# Patient Record
Sex: MALE | Race: WHITE | NOT HISPANIC OR LATINO | Employment: OTHER | URBAN - METROPOLITAN AREA
[De-identification: names, ages, dates, MRNs, and addresses within clinical notes are randomized per-mention and may not be internally consistent; named-entity substitution may affect disease eponyms.]

---

## 2019-12-14 ENCOUNTER — TRANSCRIBE ORDERS (OUTPATIENT)
Dept: RADIOLOGY | Age: 69
End: 2019-12-14

## 2019-12-14 ENCOUNTER — HOSPITAL ENCOUNTER (OUTPATIENT)
Dept: RADIOLOGY | Age: 69
Discharge: HOME | End: 2019-12-14
Attending: INTERNAL MEDICINE
Payer: MEDICARE

## 2019-12-14 DIAGNOSIS — R94.39 ABNORMAL RESULT OF OTHER CARDIOVASCULAR FUNCTION STUDY: Primary | ICD-10-CM

## 2019-12-14 DIAGNOSIS — R94.39 ABNORMAL RESULT OF OTHER CARDIOVASCULAR FUNCTION STUDY: ICD-10-CM

## 2019-12-14 PROCEDURE — 71046 X-RAY EXAM CHEST 2 VIEWS: CPT

## 2019-12-17 ENCOUNTER — APPOINTMENT (OUTPATIENT)
Dept: LAB | Facility: HOSPITAL | Age: 69
End: 2019-12-17
Attending: INTERNAL MEDICINE
Payer: MEDICARE

## 2019-12-17 ENCOUNTER — TRANSCRIBE ORDERS (OUTPATIENT)
Dept: REGISTRATION | Facility: HOSPITAL | Age: 69
End: 2019-12-17

## 2019-12-17 DIAGNOSIS — R94.39 ABNORMAL RESULT OF OTHER CARDIOVASCULAR FUNCTION STUDY: ICD-10-CM

## 2019-12-17 DIAGNOSIS — R94.39 ABNORMAL RESULT OF OTHER CARDIOVASCULAR FUNCTION STUDY: Primary | ICD-10-CM

## 2019-12-17 LAB
APTT PPP: 27 SEC (ref 23–35)
BASOPHILS # BLD: 0.01 K/UL (ref 0.01–0.1)
BASOPHILS NFR BLD: 0.1 %
DIFFERENTIAL METHOD BLD: NORMAL
EOSINOPHIL # BLD: 0.15 K/UL (ref 0.04–0.54)
EOSINOPHIL NFR BLD: 1.8 %
ERYTHROCYTE [DISTWIDTH] IN BLOOD BY AUTOMATED COUNT: 12.9 % (ref 11.6–14.4)
HCT VFR BLDCO AUTO: 40.2 % (ref 40.1–51)
HGB BLD-MCNC: 13.1 G/DL
IMM GRANULOCYTES # BLD AUTO: 0.02 K/UL (ref 0–0.08)
IMM GRANULOCYTES NFR BLD AUTO: 0.2 %
INR PPP: 1 INR
LYMPHOCYTES # BLD: 1.94 K/UL (ref 1.2–3.5)
LYMPHOCYTES NFR BLD: 23.3 %
MCH RBC QN AUTO: 30.5 PG (ref 28–33.2)
MCHC RBC AUTO-ENTMCNC: 32.6 G/DL (ref 32.2–36.5)
MCV RBC AUTO: 93.7 FL (ref 83–98)
MONOCYTES # BLD: 0.83 K/UL (ref 0.3–1)
MONOCYTES NFR BLD: 10 %
NEUTROPHILS # BLD: 5.36 K/UL (ref 1.7–7)
NEUTS SEG NFR BLD: 64.6 %
NRBC BLD-RTO: 0 %
PDW BLD AUTO: 12.1 FL (ref 9.4–12.4)
PLATELET # BLD AUTO: 272 K/UL
PROTHROMBIN TIME: 13.3 SEC (ref 12.2–14.5)
RBC # BLD AUTO: 4.29 M/UL (ref 4.5–5.8)
WBC # BLD AUTO: 8.31 K/UL

## 2019-12-17 PROCEDURE — 85610 PROTHROMBIN TIME: CPT

## 2019-12-17 PROCEDURE — 85730 THROMBOPLASTIN TIME PARTIAL: CPT

## 2019-12-17 PROCEDURE — 36415 COLL VENOUS BLD VENIPUNCTURE: CPT | Mod: GA

## 2019-12-17 PROCEDURE — 85025 COMPLETE CBC W/AUTO DIFF WBC: CPT

## 2019-12-18 ENCOUNTER — HOSPITAL ENCOUNTER (OUTPATIENT)
Facility: HOSPITAL | Age: 69
Discharge: HOME | End: 2019-12-18
Attending: INTERNAL MEDICINE | Admitting: INTERNAL MEDICINE
Payer: MEDICARE

## 2019-12-18 VITALS
OXYGEN SATURATION: 96 % | HEART RATE: 62 BPM | WEIGHT: 210 LBS | TEMPERATURE: 97.9 F | RESPIRATION RATE: 16 BRPM | BODY MASS INDEX: 29.4 KG/M2 | DIASTOLIC BLOOD PRESSURE: 66 MMHG | HEIGHT: 71 IN | SYSTOLIC BLOOD PRESSURE: 118 MMHG

## 2019-12-18 DIAGNOSIS — R94.39 ABNORMAL STRESS TEST: ICD-10-CM

## 2019-12-18 DIAGNOSIS — I25.10 CAD (CORONARY ARTERY DISEASE): Primary | ICD-10-CM

## 2019-12-18 LAB
ATRIAL RATE: 58
CATH EF ESTIMATED: 60 %
P AXIS: 66
PR INTERVAL: 180
QRS DURATION: 108
QT INTERVAL: 414
QTC CALCULATION(BAZETT): 406
R AXIS: 44
T WAVE AXIS: 45
VENTRICULAR RATE: 58

## 2019-12-18 PROCEDURE — 71000001 HC PACU PHASE 1 INITIAL 30MIN: Performed by: INTERNAL MEDICINE

## 2019-12-18 PROCEDURE — C1887 CATHETER, GUIDING: HCPCS | Performed by: INTERNAL MEDICINE

## 2019-12-18 PROCEDURE — 71000011 HC PACU PHASE 1 EA ADDL MIN: Performed by: INTERNAL MEDICINE

## 2019-12-18 PROCEDURE — 25000000 HC PHARMACY GENERAL: Performed by: INTERNAL MEDICINE

## 2019-12-18 PROCEDURE — C9600 PERC DRUG-EL COR STENT SING: HCPCS | Performed by: INTERNAL MEDICINE

## 2019-12-18 PROCEDURE — C1725 CATH, TRANSLUMIN NON-LASER: HCPCS | Performed by: INTERNAL MEDICINE

## 2019-12-18 PROCEDURE — C1769 GUIDE WIRE: HCPCS | Performed by: INTERNAL MEDICINE

## 2019-12-18 PROCEDURE — 63700000 HC SELF-ADMINISTRABLE DRUG: Performed by: INTERNAL MEDICINE

## 2019-12-18 PROCEDURE — 93458 L HRT ARTERY/VENTRICLE ANGIO: CPT | Performed by: INTERNAL MEDICINE

## 2019-12-18 PROCEDURE — 85347 COAGULATION TIME ACTIVATED: CPT | Performed by: INTERNAL MEDICINE

## 2019-12-18 PROCEDURE — B2111ZZ FLUOROSCOPY OF MULTIPLE CORONARY ARTERIES USING LOW OSMOLAR CONTRAST: ICD-10-PCS | Performed by: INTERNAL MEDICINE

## 2019-12-18 PROCEDURE — C1874 STENT, COATED/COV W/DEL SYS: HCPCS | Performed by: INTERNAL MEDICINE

## 2019-12-18 PROCEDURE — 25800000 HC PHARMACY IV SOLUTIONS: Performed by: NURSE PRACTITIONER

## 2019-12-18 PROCEDURE — 25800000 HC PHARMACY IV SOLUTIONS: Performed by: INTERNAL MEDICINE

## 2019-12-18 PROCEDURE — 63600105 HC IODINE BASED CONTRAST: Performed by: INTERNAL MEDICINE

## 2019-12-18 PROCEDURE — 027035Z DILATION OF CORONARY ARTERY, ONE ARTERY WITH TWO DRUG-ELUTING INTRALUMINAL DEVICES, PERCUTANEOUS APPROACH: ICD-10-PCS | Performed by: INTERNAL MEDICINE

## 2019-12-18 PROCEDURE — 93005 ELECTROCARDIOGRAM TRACING: CPT | Performed by: NURSE PRACTITIONER

## 2019-12-18 PROCEDURE — 63700000 HC SELF-ADMINISTRABLE DRUG: Performed by: NURSE PRACTITIONER

## 2019-12-18 PROCEDURE — C1894 INTRO/SHEATH, NON-LASER: HCPCS | Performed by: INTERNAL MEDICINE

## 2019-12-18 PROCEDURE — 4A023N7 MEASUREMENT OF CARDIAC SAMPLING AND PRESSURE, LEFT HEART, PERCUTANEOUS APPROACH: ICD-10-PCS | Performed by: INTERNAL MEDICINE

## 2019-12-18 PROCEDURE — 27200000 HC STERILE SUPPLY: Performed by: INTERNAL MEDICINE

## 2019-12-18 PROCEDURE — 63600000 HC DRUGS/DETAIL CODE: Performed by: INTERNAL MEDICINE

## 2019-12-18 DEVICE — EVEROLIMUS-ELUTING PLATINUM CHROMIUM CORONARY STENT SYSTEM
Type: IMPLANTABLE DEVICE | Site: CORONARY | Status: FUNCTIONAL
Brand: SYNERGY™

## 2019-12-18 RX ORDER — SODIUM CHLORIDE 9 MG/ML
INJECTION, SOLUTION INTRAVENOUS CONTINUOUS
Status: DISCONTINUED | OUTPATIENT
Start: 2019-12-18 | End: 2019-12-18

## 2019-12-18 RX ORDER — SODIUM CHLORIDE 450 MG/100ML
INJECTION, SOLUTION INTRAVENOUS CONTINUOUS
Status: DISCONTINUED | OUTPATIENT
Start: 2019-12-18 | End: 2019-12-18 | Stop reason: HOSPADM

## 2019-12-18 RX ORDER — NAPROXEN SODIUM 220 MG/1
81 TABLET, FILM COATED ORAL ONCE
Status: COMPLETED | OUTPATIENT
Start: 2019-12-18 | End: 2019-12-18

## 2019-12-18 RX ORDER — CLOPIDOGREL BISULFATE 75 MG/1
TABLET ORAL AS NEEDED
Status: DISCONTINUED | OUTPATIENT
Start: 2019-12-18 | End: 2019-12-18 | Stop reason: HOSPADM

## 2019-12-18 RX ORDER — FERROUS SULFATE 325(65) MG
65 TABLET ORAL
COMMUNITY

## 2019-12-18 RX ORDER — ASPIRIN 81 MG/1
81 TABLET ORAL DAILY
COMMUNITY

## 2019-12-18 RX ORDER — MIDAZOLAM HYDROCHLORIDE 2 MG/2ML
INJECTION, SOLUTION INTRAMUSCULAR; INTRAVENOUS AS NEEDED
Status: DISCONTINUED | OUTPATIENT
Start: 2019-12-18 | End: 2019-12-18 | Stop reason: HOSPADM

## 2019-12-18 RX ORDER — LOSARTAN POTASSIUM AND HYDROCHLOROTHIAZIDE 25; 100 MG/1; MG/1
1 TABLET ORAL DAILY
COMMUNITY
End: 2024-10-28 | Stop reason: HOSPADM

## 2019-12-18 RX ORDER — ALFUZOSIN HYDROCHLORIDE 10 MG/1
10 TABLET, EXTENDED RELEASE ORAL DAILY
COMMUNITY

## 2019-12-18 RX ORDER — ROSUVASTATIN CALCIUM 20 MG/1
20 TABLET, COATED ORAL DAILY
Qty: 60 TABLET | Refills: 6 | Status: SHIPPED | OUTPATIENT
Start: 2019-12-18 | End: 2024-10-28

## 2019-12-18 RX ORDER — NITROGLYCERIN 0.4 MG/1
0.4 TABLET SUBLINGUAL EVERY 5 MIN PRN
Status: DISCONTINUED | OUTPATIENT
Start: 2019-12-18 | End: 2019-12-18 | Stop reason: HOSPADM

## 2019-12-18 RX ORDER — NICARDIPINE HCL-0.9% SOD CHLOR 1 MG/10 ML
SYRINGE (ML) INTRAVENOUS AS NEEDED
Status: DISCONTINUED | OUTPATIENT
Start: 2019-12-18 | End: 2019-12-18 | Stop reason: HOSPADM

## 2019-12-18 RX ORDER — HEPARIN SODIUM 1000 [USP'U]/ML
INJECTION, SOLUTION INTRAVENOUS; SUBCUTANEOUS AS NEEDED
Status: DISCONTINUED | OUTPATIENT
Start: 2019-12-18 | End: 2019-12-18 | Stop reason: HOSPADM

## 2019-12-18 RX ORDER — ASPIRIN 325 MG
325 TABLET ORAL ONCE
Status: DISCONTINUED | OUTPATIENT
Start: 2019-12-18 | End: 2019-12-18

## 2019-12-18 RX ORDER — CLOPIDOGREL BISULFATE 75 MG/1
75 TABLET ORAL DAILY
Qty: 30 TABLET | Refills: 6 | Status: SHIPPED | OUTPATIENT
Start: 2019-12-18 | End: 2024-10-28 | Stop reason: HOSPADM

## 2019-12-18 RX ORDER — ROSUVASTATIN CALCIUM 10 MG/1
10 TABLET, COATED ORAL DAILY
Status: ON HOLD | COMMUNITY
End: 2019-12-18 | Stop reason: SDUPTHER

## 2019-12-18 RX ORDER — OMEPRAZOLE 20 MG/1
40 CAPSULE, DELAYED RELEASE ORAL
COMMUNITY

## 2019-12-18 RX ORDER — UBIDECARENONE 100 MG
200 CAPSULE ORAL DAILY
COMMUNITY

## 2019-12-18 RX ORDER — ATROPINE SULFATE 0.1 MG/ML
0.5 INJECTION INTRAVENOUS EVERY 5 MIN PRN
Status: DISCONTINUED | OUTPATIENT
Start: 2019-12-18 | End: 2019-12-18 | Stop reason: HOSPADM

## 2019-12-18 RX ORDER — ACETAMINOPHEN 500 MG
5000 TABLET ORAL DAILY
COMMUNITY

## 2019-12-18 RX ORDER — METOPROLOL SUCCINATE 25 MG/1
25 TABLET, EXTENDED RELEASE ORAL DAILY
COMMUNITY
End: 2024-10-28 | Stop reason: HOSPADM

## 2019-12-18 RX ORDER — SODIUM CHLORIDE 450 MG/100ML
INJECTION, SOLUTION INTRAVENOUS CONTINUOUS PRN
Status: COMPLETED | OUTPATIENT
Start: 2019-12-18 | End: 2019-12-18

## 2019-12-18 RX ORDER — ROSUVASTATIN CALCIUM 10 MG/1
20 TABLET, COATED ORAL DAILY
Qty: 60 TABLET | Refills: 6 | Status: SHIPPED | OUTPATIENT
Start: 2019-12-18 | End: 2019-12-18 | Stop reason: SDUPTHER

## 2019-12-18 RX ORDER — LIDOCAINE HYDROCHLORIDE 10 MG/ML
INJECTION, SOLUTION INFILTRATION; PERINEURAL AS NEEDED
Status: DISCONTINUED | OUTPATIENT
Start: 2019-12-18 | End: 2019-12-18 | Stop reason: HOSPADM

## 2019-12-18 RX ORDER — FENTANYL CITRATE 50 UG/ML
INJECTION, SOLUTION INTRAMUSCULAR; INTRAVENOUS AS NEEDED
Status: DISCONTINUED | OUTPATIENT
Start: 2019-12-18 | End: 2019-12-18 | Stop reason: HOSPADM

## 2019-12-18 RX ADMIN — SODIUM CHLORIDE: 4.5 INJECTION, SOLUTION INTRAVENOUS at 07:15

## 2019-12-18 RX ADMIN — ASPIRIN 81 MG 81 MG: 81 TABLET ORAL at 07:15

## 2019-12-18 NOTE — Clinical Note
Stent deployed in the distal left anterior descending. Pressure = 11 liz. Inflation time =  10 seconds.

## 2019-12-18 NOTE — Clinical Note
The bilateral groins and left radial was clipped, marked  and prepped with ChloraPrep. The patient was draped in a sterile fashion after allowing for the recommended dry time.

## 2019-12-18 NOTE — Clinical Note
Stent deployed in the mid left anterior descending. Pressure = 11 liz. Inflation time =  10 seconds.

## 2019-12-18 NOTE — DISCHARGE SUMMARY
Same Day Discharge after PCI.   All pre and Intra procedural criteria met.  Radial artery stable.  Patient to be discharged home with wife.  Plavix prescription sent electronically and confirmed with pharmacy.   Patient agrees to pick-up prescription today. Reinforced importance of DAPT compliance.  Tolerated diet.   Voided without difficulty. Vital signs stable post PCI.   No neuro changes.   Patient ambulated without symptoms. EKG unchanged.   Okay for discharge home.

## 2019-12-18 NOTE — PRE-PROCEDURE NOTE
Cardiac Cath Lab Pre-procedure Note    - Patient was seen and examined at bedside.  - The patient's chart and all data was reviewed.  - The procedure, treatment alternatives, risks and benefits were explained with specific risks discussed.  - Patient was consented for cardiac cath procedure and possible PCI.  - Patient's case was found appropriate for dual antiplatelet therapy.    Patient's clinical presentation to the cardiac cath lab: stable ischemic symptoms.       Patient appears to be managing well.     Notable Non-Invasive Cardiac Testing  - Stress Test: positive nuclear stress test, risk for ischemia: intermediate, date: 12/9/2019.         Total anti-anginal medications: 1.         Pre-sedation assessment  ASA 2   Mallampati class: II - soft palate, uvula, fauces visible.

## 2019-12-18 NOTE — POST-PROCEDURE NOTE
Pt ambul in department per same day dc criteria, pt pain free Ekg done and reviewed by Gretchen LEONARDO cleared for dc at 1430

## 2019-12-18 NOTE — DISCHARGE INSTRUCTIONS
Follow-up with Dr. Boswell in one week after your cardiac catheterization.    Precautions/instructions following cardiac cath via radial artery/wrist:  -NO DRIVING FOR 24 HOURS  --Limit use of affected arm for 24 hrs  --No lifting anything greater than 5lbs for 3 days with affected wrist  --Remove dressing the following day.   - Keep site clean and dry  --Avoid submerging wrist in sitting water (tub, hot tub, pool, dish-washing, ocean) for 7 days.   - You may shower  --Avoid activities where the wrist may get heavily soiled (such as gardening, housecleaning etc) for 7 days.  --Check the site daily, call physician if you notice swelling, redness, drainage, increased discomfort, new numbness or fever greater than 101F  --IF BLEEDING OCCURS:  ---sit and apply firm pressure to site with your fingers for 10 minutes. If bleeding stops, continue to sit quietly keeping your wrist straight for 2 hrs and notify your physician as soon as possible.  ---If bleeding does not stop after 10 minutes or if there is a large amount of bleeding or spurting call 911 IMMEDIATELY, DO NOT DRIVE YOURSELF TO THE HOSPITAL

## 2019-12-19 LAB
ATRIAL RATE: 68
P AXIS: 65
POCT ACT-LR: 313 SEC (ref 113–149)
POCT ACT-LR: 361 SEC (ref 113–149)
POCT TEST: ABNORMAL
POCT TEST: ABNORMAL
PR INTERVAL: 164
QRS DURATION: 88
QT INTERVAL: 382
QTC CALCULATION(BAZETT): 406
R AXIS: 39
T WAVE AXIS: 43
VENTRICULAR RATE: 68

## 2020-01-21 ENCOUNTER — TREATMENT (OUTPATIENT)
Dept: CARDIAC REHAB | Facility: HOSPITAL | Age: 70
End: 2020-01-21
Attending: INTERNAL MEDICINE
Payer: MEDICARE

## 2020-01-21 VITALS — WEIGHT: 214 LBS | HEIGHT: 67 IN | BODY MASS INDEX: 33.59 KG/M2

## 2020-01-21 DIAGNOSIS — Z98.61 S/P PTCA (PERCUTANEOUS TRANSLUMINAL CORONARY ANGIOPLASTY): Primary | ICD-10-CM

## 2020-01-21 PROCEDURE — 93798 PHYS/QHP OP CAR RHAB W/ECG: CPT

## 2020-01-21 NOTE — LETTER
20    Re: Klaus Branch    : 1950    CARDIAC REHAB UNDERWAY!    Dear Dr. Boswell:    Thank you for referring your patient, Klaus Branch, to our cardiac rehabilitation program. The patient has completed the intake process and is about to begin cardiac rehab up to three times per week for both exercise and education. Rehab emphasis will be on risk factor reduction/secondary prevention. We will be working with your patient on:    Exercise safety, healthy eating, weight loss.    And, since medication compliance is an integral part of overall cardiac disease management, we are attaching a copy of the patient's current medication list as he reported it to us. Please note any discrepancies with what you have prescribed.       Current Outpatient Medications:   •  alfuzosin (UROXATRAL) 10 mg 24 hr tablet, Take 10 mg by mouth daily., Disp: , Rfl:   •  aspirin 81 mg enteric coated tablet, Take 81 mg by mouth daily., Disp: , Rfl:   •  cholecalciferol, vitamin D3, 5,000 unit (125 mcg) tablet, Take 5,000 Units by mouth daily., Disp: , Rfl:   •  clopidogrel (PLAVIX) 75 mg tablet, Take 1 tablet (75 mg total) by mouth daily., Disp: 30 tablet, Rfl: 6  •  coenzyme Q10 (COQ10) 100 mg capsule, Take 200 mg by mouth daily., Disp: , Rfl:   •  ferrous sulfate (IRON) 325 mg (65 mg iron) tablet, Take 65 mg by mouth daily with breakfast., Disp: , Rfl:   •  losartan-hydrochlorothiazide (HYZAAR) 100-25 mg per tablet, Take 1 tablet by mouth daily., Disp: , Rfl:   •  metoprolol succinate XL (TOPROL-XL) 25 mg 24 hr tablet, Take 25 mg by mouth daily., Disp: , Rfl:   •  omeprazole (PriLOSEC) 20 mg capsule, Take 40 mg by mouth daily before breakfast., Disp: , Rfl:   •  rosuvastatin (CRESTOR) 20 mg tablet, Take 1 tablet (20 mg total) by mouth daily., Disp: 60 tablet, Rfl: 6       Your patient has identified the following personal goals to be achieved as a result of rehab participation:    Return to prior activities, routine gym  schedule, increase water intake, decrease desserts.    We will be in touch with your office periodically to both request and provide information of mutual interest and, of course, we will notify you immediately of any major change in your patient's condition while in rehab.      Please feel free to call or stop by anytime to discuss your patient's progress and to see your patient in rehab action!    Sincerely,    Saadia Acevedo RN     [address] [place] [address]  Phone [phone]  Fax  [phone]      DIAGNOSIS: S/P PTCA (percutaneous transluminal coronary angioplasty)  (primary encounter diagnosis)

## 2020-01-22 ENCOUNTER — TREATMENT (OUTPATIENT)
Dept: CARDIAC REHAB | Facility: HOSPITAL | Age: 70
End: 2020-01-22
Attending: INTERNAL MEDICINE
Payer: MEDICARE

## 2020-01-22 DIAGNOSIS — Z98.61 S/P PTCA (PERCUTANEOUS TRANSLUMINAL CORONARY ANGIOPLASTY): Primary | ICD-10-CM

## 2020-01-22 PROCEDURE — 93798 PHYS/QHP OP CAR RHAB W/ECG: CPT

## 2020-01-24 ENCOUNTER — TREATMENT (OUTPATIENT)
Dept: CARDIAC REHAB | Facility: HOSPITAL | Age: 70
End: 2020-01-24
Attending: INTERNAL MEDICINE
Payer: MEDICARE

## 2020-01-24 DIAGNOSIS — Z98.61 S/P PTCA (PERCUTANEOUS TRANSLUMINAL CORONARY ANGIOPLASTY): Primary | ICD-10-CM

## 2020-01-24 PROCEDURE — 93798 PHYS/QHP OP CAR RHAB W/ECG: CPT

## 2020-01-24 NOTE — PROGRESS NOTES
Pt exercised at phase two cardiac rehab with no issues. Please refer to scanned report. Started free weights today, lion. Well.

## 2020-01-27 ENCOUNTER — TREATMENT (OUTPATIENT)
Dept: CARDIAC REHAB | Facility: HOSPITAL | Age: 70
End: 2020-01-27
Attending: INTERNAL MEDICINE
Payer: MEDICARE

## 2020-01-27 DIAGNOSIS — Z98.61 S/P PTCA (PERCUTANEOUS TRANSLUMINAL CORONARY ANGIOPLASTY): Primary | ICD-10-CM

## 2020-01-27 PROCEDURE — 93798 PHYS/QHP OP CAR RHAB W/ECG: CPT

## 2020-01-27 NOTE — PROGRESS NOTES
Pt exercised at phase two cardiac rehab without issues. May use RPE for THR, explained to patient.  Please refer to scanned report.

## 2020-01-29 ENCOUNTER — TREATMENT (OUTPATIENT)
Dept: CARDIAC REHAB | Facility: HOSPITAL | Age: 70
End: 2020-01-29
Attending: INTERNAL MEDICINE
Payer: MEDICARE

## 2020-01-29 DIAGNOSIS — Z98.61 S/P PTCA (PERCUTANEOUS TRANSLUMINAL CORONARY ANGIOPLASTY): Primary | ICD-10-CM

## 2020-01-29 PROCEDURE — 93798 PHYS/QHP OP CAR RHAB W/ECG: CPT

## 2020-01-31 ENCOUNTER — TREATMENT (OUTPATIENT)
Dept: CARDIAC REHAB | Facility: HOSPITAL | Age: 70
End: 2020-01-31
Attending: INTERNAL MEDICINE
Payer: MEDICARE

## 2020-01-31 DIAGNOSIS — Z98.61 S/P PTCA (PERCUTANEOUS TRANSLUMINAL CORONARY ANGIOPLASTY): Primary | ICD-10-CM

## 2020-01-31 PROCEDURE — 93798 PHYS/QHP OP CAR RHAB W/ECG: CPT

## 2020-02-03 ENCOUNTER — TREATMENT (OUTPATIENT)
Dept: CARDIAC REHAB | Facility: HOSPITAL | Age: 70
End: 2020-02-03
Attending: INTERNAL MEDICINE
Payer: MEDICARE

## 2020-02-03 DIAGNOSIS — Z98.61 S/P PTCA (PERCUTANEOUS TRANSLUMINAL CORONARY ANGIOPLASTY): Primary | ICD-10-CM

## 2020-02-03 PROCEDURE — 93798 PHYS/QHP OP CAR RHAB W/ECG: CPT

## 2020-02-05 ENCOUNTER — TREATMENT (OUTPATIENT)
Dept: CARDIAC REHAB | Facility: HOSPITAL | Age: 70
End: 2020-02-05
Attending: INTERNAL MEDICINE
Payer: MEDICARE

## 2020-02-05 DIAGNOSIS — Z98.61 S/P PTCA (PERCUTANEOUS TRANSLUMINAL CORONARY ANGIOPLASTY): Primary | ICD-10-CM

## 2020-02-05 PROCEDURE — 93798 PHYS/QHP OP CAR RHAB W/ECG: CPT

## 2020-02-07 ENCOUNTER — TREATMENT (OUTPATIENT)
Dept: CARDIAC REHAB | Facility: HOSPITAL | Age: 70
End: 2020-02-07
Attending: INTERNAL MEDICINE
Payer: MEDICARE

## 2020-02-07 DIAGNOSIS — Z98.61 S/P PTCA (PERCUTANEOUS TRANSLUMINAL CORONARY ANGIOPLASTY): Primary | ICD-10-CM

## 2020-02-07 PROCEDURE — 93798 PHYS/QHP OP CAR RHAB W/ECG: CPT

## 2020-02-10 ENCOUNTER — TREATMENT (OUTPATIENT)
Dept: CARDIAC REHAB | Facility: HOSPITAL | Age: 70
End: 2020-02-10
Attending: INTERNAL MEDICINE
Payer: MEDICARE

## 2020-02-10 DIAGNOSIS — Z98.61 S/P PTCA (PERCUTANEOUS TRANSLUMINAL CORONARY ANGIOPLASTY): Primary | ICD-10-CM

## 2020-02-10 PROCEDURE — 93798 PHYS/QHP OP CAR RHAB W/ECG: CPT

## 2020-02-12 ENCOUNTER — TREATMENT (OUTPATIENT)
Dept: CARDIAC REHAB | Facility: HOSPITAL | Age: 70
End: 2020-02-12
Attending: INTERNAL MEDICINE
Payer: MEDICARE

## 2020-02-12 DIAGNOSIS — Z98.61 S/P PTCA (PERCUTANEOUS TRANSLUMINAL CORONARY ANGIOPLASTY): Primary | ICD-10-CM

## 2020-02-12 PROCEDURE — 93798 PHYS/QHP OP CAR RHAB W/ECG: CPT

## 2020-02-12 NOTE — PROGRESS NOTES
Pt exercised at phase two cardiac rehab without issues. HCTZ decreased to every other day. Please refer to scanned report.

## 2020-02-14 ENCOUNTER — TREATMENT (OUTPATIENT)
Dept: CARDIAC REHAB | Facility: HOSPITAL | Age: 70
End: 2020-02-14
Attending: INTERNAL MEDICINE
Payer: MEDICARE

## 2020-02-14 DIAGNOSIS — Z98.61 S/P PTCA (PERCUTANEOUS TRANSLUMINAL CORONARY ANGIOPLASTY): Primary | ICD-10-CM

## 2020-02-14 PROCEDURE — 93798 PHYS/QHP OP CAR RHAB W/ECG: CPT

## 2020-02-17 ENCOUNTER — TREATMENT (OUTPATIENT)
Dept: CARDIAC REHAB | Facility: HOSPITAL | Age: 70
End: 2020-02-17
Attending: INTERNAL MEDICINE
Payer: MEDICARE

## 2020-02-17 DIAGNOSIS — Z98.61 S/P PTCA (PERCUTANEOUS TRANSLUMINAL CORONARY ANGIOPLASTY): Primary | ICD-10-CM

## 2020-02-17 PROCEDURE — 93798 PHYS/QHP OP CAR RHAB W/ECG: CPT

## 2020-02-19 ENCOUNTER — TREATMENT (OUTPATIENT)
Dept: CARDIAC REHAB | Facility: HOSPITAL | Age: 70
End: 2020-02-19
Attending: INTERNAL MEDICINE
Payer: MEDICARE

## 2020-02-19 DIAGNOSIS — Z98.61 S/P PTCA (PERCUTANEOUS TRANSLUMINAL CORONARY ANGIOPLASTY): Primary | ICD-10-CM

## 2020-02-19 PROCEDURE — 93798 PHYS/QHP OP CAR RHAB W/ECG: CPT

## 2020-02-19 NOTE — LETTER
20      RE: Klaus Branch    : 1950      Dear Dr. Montenegro ref. provider found:    Thank your for the referral of your patient to our cardiac rehab program. routine assessments for patients entering the program include an extensive intake interview as well as the patient's completion of a quality of life (QOL) survey and PHQ9 depression screen score. Several questions in each of those tools focus on the patient's psychological/emotional state. Based on your patient's scores in key QOL areas, PHQ9 scores and/or our clinical findings to date, we wish to bring to your attention that your patient may be in need of further emotional support or psychological evaluation. Our findings indicate:    Score has improved from 6 down to 2  PHQ9 Depression scale score: 6 initial down to 2 as of 20      Much improved from first rehab visit    Comments: Patient attending rehab regularly, working very hard.      Further evaluation/counseling by another healthcare professional may also be needed. Please feel free to discuss these issues with your patient or to refer the patient for additional services that you determine to be appropriate. Thank you for your attention to this important clinical concern. Please let us know if we can be of any further assistance in this matter as we continue your patient's cardiac rehabilitation.       Sincerely,

## 2020-02-21 ENCOUNTER — TREATMENT (OUTPATIENT)
Dept: CARDIAC REHAB | Facility: HOSPITAL | Age: 70
End: 2020-02-21
Attending: INTERNAL MEDICINE
Payer: MEDICARE

## 2020-02-21 DIAGNOSIS — Z98.61 S/P PTCA (PERCUTANEOUS TRANSLUMINAL CORONARY ANGIOPLASTY): Primary | ICD-10-CM

## 2020-02-21 PROCEDURE — 93798 PHYS/QHP OP CAR RHAB W/ECG: CPT

## 2020-02-28 ENCOUNTER — TREATMENT (OUTPATIENT)
Dept: CARDIAC REHAB | Facility: HOSPITAL | Age: 70
End: 2020-02-28
Attending: INTERNAL MEDICINE
Payer: MEDICARE

## 2020-02-28 DIAGNOSIS — Z98.61 S/P PTCA (PERCUTANEOUS TRANSLUMINAL CORONARY ANGIOPLASTY): Primary | ICD-10-CM

## 2020-02-28 PROCEDURE — 93798 PHYS/QHP OP CAR RHAB W/ECG: CPT

## 2020-03-02 ENCOUNTER — TREATMENT (OUTPATIENT)
Dept: CARDIAC REHAB | Facility: HOSPITAL | Age: 70
End: 2020-03-02
Attending: INTERNAL MEDICINE
Payer: MEDICARE

## 2020-03-02 DIAGNOSIS — Z98.61 S/P PTCA (PERCUTANEOUS TRANSLUMINAL CORONARY ANGIOPLASTY): Primary | ICD-10-CM

## 2020-03-02 PROCEDURE — 93798 PHYS/QHP OP CAR RHAB W/ECG: CPT

## 2020-03-04 ENCOUNTER — TREATMENT (OUTPATIENT)
Dept: CARDIAC REHAB | Facility: HOSPITAL | Age: 70
End: 2020-03-04
Attending: INTERNAL MEDICINE
Payer: MEDICARE

## 2020-03-04 DIAGNOSIS — Z98.61 S/P PTCA (PERCUTANEOUS TRANSLUMINAL CORONARY ANGIOPLASTY): Primary | ICD-10-CM

## 2020-03-04 PROCEDURE — 93798 PHYS/QHP OP CAR RHAB W/ECG: CPT

## 2020-03-04 NOTE — PROGRESS NOTES
Pt exercised at phase two cardiac rehab with no issues. Please refer to scanned report. Leaving tomorrow for Tanika Barbosa.

## 2020-03-13 ENCOUNTER — TREATMENT (OUTPATIENT)
Dept: CARDIAC REHAB | Facility: HOSPITAL | Age: 70
End: 2020-03-13
Attending: INTERNAL MEDICINE
Payer: MEDICARE

## 2020-03-13 DIAGNOSIS — Z98.61 S/P PTCA (PERCUTANEOUS TRANSLUMINAL CORONARY ANGIOPLASTY): Primary | ICD-10-CM

## 2020-03-13 PROCEDURE — 93798 PHYS/QHP OP CAR RHAB W/ECG: CPT

## 2020-05-18 ENCOUNTER — TREATMENT (OUTPATIENT)
Dept: CARDIAC REHAB | Facility: HOSPITAL | Age: 70
End: 2020-05-18
Attending: INTERNAL MEDICINE
Payer: MEDICARE

## 2020-05-18 VITALS — WEIGHT: 214 LBS | HEIGHT: 67 IN | BODY MASS INDEX: 33.59 KG/M2

## 2020-05-18 DIAGNOSIS — Z98.61 S/P PTCA (PERCUTANEOUS TRANSLUMINAL CORONARY ANGIOPLASTY): Primary | ICD-10-CM

## 2020-05-18 PROCEDURE — 93798 PHYS/QHP OP CAR RHAB W/ECG: CPT

## 2020-05-18 NOTE — PROGRESS NOTES
Returns after restrictions from covid 19.  Pt exercised at phase two cardiac rehab with no issues. Please refer to scanned report.

## 2020-05-20 ENCOUNTER — TREATMENT (OUTPATIENT)
Dept: CARDIAC REHAB | Facility: HOSPITAL | Age: 70
End: 2020-05-20
Attending: INTERNAL MEDICINE
Payer: MEDICARE

## 2020-05-20 DIAGNOSIS — Z98.61 S/P PTCA (PERCUTANEOUS TRANSLUMINAL CORONARY ANGIOPLASTY): Primary | ICD-10-CM

## 2020-05-20 PROCEDURE — 93798 PHYS/QHP OP CAR RHAB W/ECG: CPT

## 2020-05-22 ENCOUNTER — TREATMENT (OUTPATIENT)
Dept: CARDIAC REHAB | Facility: HOSPITAL | Age: 70
End: 2020-05-22
Attending: INTERNAL MEDICINE
Payer: MEDICARE

## 2020-05-22 DIAGNOSIS — Z98.61 S/P PTCA (PERCUTANEOUS TRANSLUMINAL CORONARY ANGIOPLASTY): Primary | ICD-10-CM

## 2020-05-22 PROCEDURE — 93798 PHYS/QHP OP CAR RHAB W/ECG: CPT

## 2020-05-27 ENCOUNTER — TREATMENT (OUTPATIENT)
Dept: CARDIAC REHAB | Facility: HOSPITAL | Age: 70
End: 2020-05-27
Attending: INTERNAL MEDICINE
Payer: MEDICARE

## 2020-05-27 DIAGNOSIS — Z98.61 S/P PTCA (PERCUTANEOUS TRANSLUMINAL CORONARY ANGIOPLASTY): Primary | ICD-10-CM

## 2020-05-27 PROCEDURE — 93798 PHYS/QHP OP CAR RHAB W/ECG: CPT

## 2020-05-29 ENCOUNTER — TREATMENT (OUTPATIENT)
Dept: CARDIAC REHAB | Facility: HOSPITAL | Age: 70
End: 2020-05-29
Attending: INTERNAL MEDICINE
Payer: MEDICARE

## 2020-05-29 DIAGNOSIS — Z98.61 S/P PTCA (PERCUTANEOUS TRANSLUMINAL CORONARY ANGIOPLASTY): Primary | ICD-10-CM

## 2020-05-29 PROCEDURE — 93798 PHYS/QHP OP CAR RHAB W/ECG: CPT

## 2020-06-01 ENCOUNTER — TREATMENT (OUTPATIENT)
Dept: CARDIAC REHAB | Facility: HOSPITAL | Age: 70
End: 2020-06-01
Attending: INTERNAL MEDICINE
Payer: MEDICARE

## 2020-06-01 DIAGNOSIS — Z98.61 S/P PTCA (PERCUTANEOUS TRANSLUMINAL CORONARY ANGIOPLASTY): Primary | ICD-10-CM

## 2020-06-01 PROCEDURE — 93798 PHYS/QHP OP CAR RHAB W/ECG: CPT

## 2020-06-02 ENCOUNTER — TREATMENT (OUTPATIENT)
Dept: CARDIAC REHAB | Facility: HOSPITAL | Age: 70
End: 2020-06-02
Attending: INTERNAL MEDICINE
Payer: MEDICARE

## 2020-06-02 DIAGNOSIS — Z98.61 S/P PTCA (PERCUTANEOUS TRANSLUMINAL CORONARY ANGIOPLASTY): Primary | ICD-10-CM

## 2020-06-02 PROCEDURE — 93798 PHYS/QHP OP CAR RHAB W/ECG: CPT

## 2020-06-04 ENCOUNTER — TREATMENT (OUTPATIENT)
Dept: CARDIAC REHAB | Facility: HOSPITAL | Age: 70
End: 2020-06-04
Attending: INTERNAL MEDICINE
Payer: MEDICARE

## 2020-06-04 DIAGNOSIS — Z98.61 S/P PTCA (PERCUTANEOUS TRANSLUMINAL CORONARY ANGIOPLASTY): Primary | ICD-10-CM

## 2020-06-04 PROCEDURE — 93798 PHYS/QHP OP CAR RHAB W/ECG: CPT

## 2020-06-09 ENCOUNTER — TREATMENT (OUTPATIENT)
Dept: CARDIAC REHAB | Facility: HOSPITAL | Age: 70
End: 2020-06-09
Attending: INTERNAL MEDICINE
Payer: MEDICARE

## 2020-06-09 DIAGNOSIS — Z98.61 S/P PTCA (PERCUTANEOUS TRANSLUMINAL CORONARY ANGIOPLASTY): Primary | ICD-10-CM

## 2020-06-09 PROCEDURE — 93798 PHYS/QHP OP CAR RHAB W/ECG: CPT

## 2020-06-09 NOTE — PROGRESS NOTES
"Pt exercised at phase two cardiac rehab with no issues. Please refer to scanned report. Had a \"shot\" in his back for herniated discs. Feels improved.  "

## 2020-06-10 ENCOUNTER — TREATMENT (OUTPATIENT)
Dept: CARDIAC REHAB | Facility: HOSPITAL | Age: 70
End: 2020-06-10
Attending: INTERNAL MEDICINE
Payer: MEDICARE

## 2020-06-10 DIAGNOSIS — Z98.61 S/P PTCA (PERCUTANEOUS TRANSLUMINAL CORONARY ANGIOPLASTY): Primary | ICD-10-CM

## 2020-06-10 PROCEDURE — 93798 PHYS/QHP OP CAR RHAB W/ECG: CPT

## 2020-06-12 ENCOUNTER — TREATMENT (OUTPATIENT)
Dept: CARDIAC REHAB | Facility: HOSPITAL | Age: 70
End: 2020-06-12
Attending: INTERNAL MEDICINE
Payer: MEDICARE

## 2020-06-12 DIAGNOSIS — Z98.61 S/P PTCA (PERCUTANEOUS TRANSLUMINAL CORONARY ANGIOPLASTY): Primary | ICD-10-CM

## 2020-06-12 PROCEDURE — 93798 PHYS/QHP OP CAR RHAB W/ECG: CPT

## 2020-06-15 ENCOUNTER — TREATMENT (OUTPATIENT)
Dept: CARDIAC REHAB | Facility: HOSPITAL | Age: 70
End: 2020-06-15
Attending: INTERNAL MEDICINE
Payer: MEDICARE

## 2020-06-15 VITALS — BODY MASS INDEX: 32.96 KG/M2 | HEIGHT: 67 IN | WEIGHT: 210 LBS

## 2020-06-15 DIAGNOSIS — Z98.61 S/P PTCA (PERCUTANEOUS TRANSLUMINAL CORONARY ANGIOPLASTY): Primary | ICD-10-CM

## 2020-06-15 PROCEDURE — 93798 PHYS/QHP OP CAR RHAB W/ECG: CPT

## 2020-06-17 ENCOUNTER — TREATMENT (OUTPATIENT)
Dept: CARDIAC REHAB | Facility: HOSPITAL | Age: 70
End: 2020-06-17
Attending: INTERNAL MEDICINE
Payer: MEDICARE

## 2020-06-17 DIAGNOSIS — Z98.61 S/P PTCA (PERCUTANEOUS TRANSLUMINAL CORONARY ANGIOPLASTY): Primary | ICD-10-CM

## 2020-06-17 PROCEDURE — 93798 PHYS/QHP OP CAR RHAB W/ECG: CPT

## 2020-06-19 ENCOUNTER — TREATMENT (OUTPATIENT)
Dept: CARDIAC REHAB | Facility: HOSPITAL | Age: 70
End: 2020-06-19
Attending: INTERNAL MEDICINE
Payer: MEDICARE

## 2020-06-19 DIAGNOSIS — Z98.61 S/P PTCA (PERCUTANEOUS TRANSLUMINAL CORONARY ANGIOPLASTY): Primary | ICD-10-CM

## 2020-06-19 PROCEDURE — 93798 PHYS/QHP OP CAR RHAB W/ECG: CPT

## 2020-06-22 ENCOUNTER — TREATMENT (OUTPATIENT)
Dept: CARDIAC REHAB | Facility: HOSPITAL | Age: 70
End: 2020-06-22
Attending: INTERNAL MEDICINE
Payer: MEDICARE

## 2020-06-22 DIAGNOSIS — Z98.61 S/P PTCA (PERCUTANEOUS TRANSLUMINAL CORONARY ANGIOPLASTY): Primary | ICD-10-CM

## 2020-06-22 PROCEDURE — 93798 PHYS/QHP OP CAR RHAB W/ECG: CPT

## 2020-06-24 ENCOUNTER — TREATMENT (OUTPATIENT)
Dept: CARDIAC REHAB | Facility: HOSPITAL | Age: 70
End: 2020-06-24
Attending: INTERNAL MEDICINE
Payer: MEDICARE

## 2020-06-24 DIAGNOSIS — Z98.61 S/P PTCA (PERCUTANEOUS TRANSLUMINAL CORONARY ANGIOPLASTY): Primary | ICD-10-CM

## 2020-06-24 PROCEDURE — 93798 PHYS/QHP OP CAR RHAB W/ECG: CPT

## 2020-06-24 NOTE — PROGRESS NOTES
Pt exercised at phase two cardiac rehab without issues.  Reviewed D/C plan.  Please refer to scanned report.

## 2020-06-26 ENCOUNTER — TREATMENT (OUTPATIENT)
Dept: CARDIAC REHAB | Facility: HOSPITAL | Age: 70
End: 2020-06-26
Attending: INTERNAL MEDICINE
Payer: MEDICARE

## 2020-06-26 DIAGNOSIS — Z98.61 S/P PTCA (PERCUTANEOUS TRANSLUMINAL CORONARY ANGIOPLASTY): Primary | ICD-10-CM

## 2020-06-26 PROCEDURE — 99000033 HC CARDIAC REHAB 1X PER WK

## 2020-06-26 NOTE — LETTER
20    RE: Klaus Branch    : 1950      Dear Dr. Boswell:    Thank you for referring your patient to our cardiac rehab program. The patient has completed 36 visits in the rehabilitation program and is about to graduate!    Attached for your review and records is a detailed report showing the changes your patient accomplished through rehab efforts. These results will be added to our program's database from which we will be happy to provide to you, upon your request,  with periodic updates of our aggregate outcomes.    Your patient has been given specific exercise maintenance guidelines and has elected to continue to exercise at return to gym when able, work out on own until then.    You will also be receiving an updated medication report for you to compare to your office records. And, at upcoming office visits, your reinforcement in the following lifestyle areas would be most helpful: healthy eating, continued weight loss, sleeping hygiene.     While in the program the patient received counseling in the following areas:   Healthy eating, exercise safety, stress reduction.    The patient continues to need follow up with:  healthy eating, continued weight loss, sleeping hygiene.      Again, thank you for referring your patient to our rehab program. We can be reached at [phone].and we appreciate your continuing support of our secondary prevention services.     Sincerely,    Saadia Acevedo RN     [address] [place] [address]  Phone [phone]  Fax  [phone]        Current Outpatient Medications:   •  alfuzosin (UROXATRAL) 10 mg 24 hr tablet, Take 10 mg by mouth daily., Disp: , Rfl:   •  aspirin 81 mg enteric coated tablet, Take 81 mg by mouth daily., Disp: , Rfl:   •  cholecalciferol, vitamin D3, 5,000 unit (125 mcg) tablet, Take 5,000 Units by mouth daily., Disp: , Rfl:   •  clopidogrel (PLAVIX) 75 mg tablet, Take 1 tablet (75 mg total) by mouth daily., Disp: 30 tablet, Rfl: 6  •   coenzyme Q10 (COQ10) 100 mg capsule, Take 200 mg by mouth daily., Disp: , Rfl:   •  ferrous sulfate (IRON) 325 mg (65 mg iron) tablet, Take 65 mg by mouth daily with breakfast., Disp: , Rfl:   •  losartan-hydrochlorothiazide (HYZAAR) 100-25 mg per tablet, Take 1 tablet by mouth daily., Disp: , Rfl:   •  metoprolol succinate XL (TOPROL-XL) 25 mg 24 hr tablet, Take 25 mg by mouth daily., Disp: , Rfl:   •  omeprazole (PriLOSEC) 20 mg capsule, Take 40 mg by mouth daily before breakfast., Disp: , Rfl:   •  rosuvastatin (CRESTOR) 20 mg tablet, Take 1 tablet (20 mg total) by mouth daily., Disp: 60 tablet, Rfl: 6

## 2020-06-26 NOTE — PROGRESS NOTES
Pt exercised at phase two cardiac rehab with no issues. Completed Cardiac rehab today.  Please refer to scanned report.

## 2020-06-29 VITALS — BODY MASS INDEX: 32.33 KG/M2 | WEIGHT: 206 LBS | HEIGHT: 67 IN

## 2021-04-13 DIAGNOSIS — Z23 ENCOUNTER FOR IMMUNIZATION: ICD-10-CM

## 2021-12-14 NOTE — PROGRESS NOTES
Monitored exercise session.   Quinolones Counseling:  I discussed with the patient the risks of fluoroquinolones including but not limited to GI upset, allergic reaction, drug rash, diarrhea, dizziness, photosensitivity, yeast infections, liver function test abnormalities, tendonitis/tendon rupture.

## 2023-06-29 NOTE — Clinical Note
Patient placed on procedure table in supine position with left arm extended. Positioning devices: all pressure points padded, arm board under arms and safety strap applied.  [T-Score ___] : T-score: [unfilled] [Major Osteoporotic Fracture (%): ___] : Major Osteoporotic Fracture (%):[unfilled] [Hip Fracture (%):___] : Hip Fracture (%): [unfilled] [FreeTextEntry4] : 07/13/22 [de-identified] : Vertebral fracture analysis demonstrates mild T11 compression fracture.\par \par Impression:  Osteopenia of the spine and left femoral neck. Mild T11 compression fracture.

## 2024-10-28 ENCOUNTER — ANESTHESIA (OUTPATIENT)
Dept: ENDOSCOPY | Facility: HOSPITAL | Age: 74
End: 2024-10-28
Payer: MEDICARE

## 2024-10-28 ENCOUNTER — ANESTHESIA EVENT (OUTPATIENT)
Dept: ENDOSCOPY | Facility: HOSPITAL | Age: 74
End: 2024-10-28
Payer: MEDICARE

## 2024-10-28 ENCOUNTER — HOSPITAL ENCOUNTER (OUTPATIENT)
Facility: HOSPITAL | Age: 74
Discharge: HOME | End: 2024-10-28
Attending: INTERNAL MEDICINE | Admitting: INTERNAL MEDICINE
Payer: MEDICARE

## 2024-10-28 VITALS
SYSTOLIC BLOOD PRESSURE: 127 MMHG | HEIGHT: 71 IN | WEIGHT: 205 LBS | DIASTOLIC BLOOD PRESSURE: 72 MMHG | BODY MASS INDEX: 28.7 KG/M2 | HEART RATE: 60 BPM | RESPIRATION RATE: 16 BRPM | OXYGEN SATURATION: 96 % | TEMPERATURE: 96.3 F

## 2024-10-28 DIAGNOSIS — I48.0 PAROXYSMAL ATRIAL FIBRILLATION (CMS/HCC): ICD-10-CM

## 2024-10-28 PROCEDURE — 5A2204Z RESTORATION OF CARDIAC RHYTHM, SINGLE: ICD-10-PCS | Performed by: INTERNAL MEDICINE

## 2024-10-28 PROCEDURE — 71000011 HC PACU PHASE 1 EA ADDL MIN: Performed by: INTERNAL MEDICINE

## 2024-10-28 PROCEDURE — 92960 CARDIOVERSION ELECTRIC EXT: CPT | Performed by: INTERNAL MEDICINE

## 2024-10-28 PROCEDURE — 37000001 HC ANESTHESIA GENERAL: Performed by: INTERNAL MEDICINE

## 2024-10-28 PROCEDURE — 63600000 HC DRUGS/DETAIL CODE: Mod: JW | Performed by: NURSE ANESTHETIST, CERTIFIED REGISTERED

## 2024-10-28 PROCEDURE — 71000001 HC PACU PHASE 1 INITIAL 30MIN: Performed by: INTERNAL MEDICINE

## 2024-10-28 PROCEDURE — 25800000 HC PHARMACY IV SOLUTIONS: Performed by: INTERNAL MEDICINE

## 2024-10-28 RX ORDER — EZETIMIBE 10 MG/1
1 TABLET ORAL DAILY
COMMUNITY
Start: 2020-04-14

## 2024-10-28 RX ORDER — METOPROLOL SUCCINATE 50 MG/1
TABLET, EXTENDED RELEASE ORAL
COMMUNITY
Start: 2024-09-16

## 2024-10-28 RX ORDER — APIXABAN 5 MG/1
5 TABLET, FILM COATED ORAL 2 TIMES DAILY
COMMUNITY
Start: 2024-09-11

## 2024-10-28 RX ORDER — PROPOFOL 10 MG/ML
INJECTION, EMULSION INTRAVENOUS AS NEEDED
Status: DISCONTINUED | OUTPATIENT
Start: 2024-10-28 | End: 2024-10-28 | Stop reason: SURG

## 2024-10-28 RX ORDER — LOSARTAN POTASSIUM 100 MG/1
TABLET ORAL
COMMUNITY

## 2024-10-28 RX ORDER — SODIUM CHLORIDE 9 MG/ML
INJECTION, SOLUTION INTRAVENOUS CONTINUOUS
Status: DISCONTINUED | OUTPATIENT
Start: 2024-10-28 | End: 2024-10-28 | Stop reason: HOSPADM

## 2024-10-28 RX ADMIN — SODIUM CHLORIDE: 9 INJECTION, SOLUTION INTRAVENOUS at 07:21

## 2024-10-28 RX ADMIN — PROPOFOL 80 MG: 10 INJECTION, EMULSION INTRAVENOUS at 07:24

## 2024-10-28 ASSESSMENT — ENCOUNTER SYMPTOMS: DYSRHYTHMIAS: 1

## 2024-10-28 NOTE — ANESTHESIA PREPROCEDURE EVALUATION
Relevant Problems   CARDIOVASCULAR   (+) CAD (coronary artery disease)       Anesthesia ROS/MED HX    Anesthesia History - neg  Pulmonary - neg  Neuro/Psych - neg  Cardiovascular   CAD   Cardiac stents   hypertension  Dysrhythmias and atrial fibrillation   Cardiac clearance reviewed, echocardiogram reviewed and ECG reviewed  Abnormal ECG  Hematological - neg  GI/Hepatic   GERD    Control: well controlled  Musculoskeletal- neg  Renal Disease- neg  Endo/Other- neg  Body Habitus: Overweight       Past Surgical History   Procedure Laterality Date    Bladder surgery      Urolift    LEFT HEART CATH WITH CORONARY ANGIOGRAPHY N/A 12/18/2019    Performed by Goran Ribeiro MD at  [facility] CATH/EP    Shoulder surgery Right     right sided    Stent CANDI coronary - initial vessel N/A 12/18/2019    Performed by Goran Ribeiro MD at  [facility] C[facility]/EP       Physical Exam    Airway   Mallampati: II   TM distance: >3 FB   Neck ROM: full  Cardiovascular    Rhythm: irregularDental - normal        Anesthesia Plan    Plan: general and MAC       3 ASA

## 2024-10-28 NOTE — ANESTHESIOLOGIST PRE-PROCEDURE ATTESTATION
Pre-Procedure Patient Identification:  I am the Primary Anesthesiologist and have identified the patient on 10/28/24 at 7:17 AM.   I have confirmed the procedure(s) will be performed by the following surgeon/proceduralist Matthew Leonard MD.

## 2024-10-28 NOTE — Clinical Note
ID band present and verified with patient name and . Patient label affixed to IV tubing connected to patient.

## 2024-10-28 NOTE — DISCHARGE INSTRUCTIONS
Follow up with Dr. Boswell as scheduled on 11/13  Call his office with any questions or concerns prior to your appointment   [phone]  Take medications as instructed             Electrical Cardioversion, Care After   If you have problems or questions, contact your health care provider.    What can I expect after the procedure?  After the procedure, it is common to have:  Some redness on the skin where the shocks were given.      Follow these instructions at home:  Do not drive for 24 hours if you were given a medicine to help you relax (sedative).  Take over-the-counter and prescription medicines only as told by your health care provider.  Ask your health care provider how to check your pulse. Check it often.  Rest for 48 hours after the procedure or as told by your health care provider.  Avoid or limit your caffeine use as told by your health care provider.      Contact a health care provider if:  You feel like your heart is beating too quickly or your pulse is not regular.  You have a serious muscle cramp that does not go away.      Get help right away if:  You have discomfort in your chest.  You are dizzy or you feel faint.  You have trouble breathing or you are short of breath.  Your speech is slurred.  You have trouble moving an arm or leg on one side of your body.  Your fingers or toes turn cold or blue.

## 2024-10-28 NOTE — ANESTHESIA POSTPROCEDURE EVALUATION
Patient: Klaus Branch    Procedure Summary       Date: 10/28/24 Room / Location:  [facility]    Anesthesia Start: 0721 Anesthesia Stop: 0731    Procedure: Cardioversion external Diagnosis:       Paroxysmal atrial fibrillation (CMS/HCC)      (Paroxysmal atrial fibrillation (CMS/HCC) [I48.0])    Providers: Matthew Leonard MD Responsible Provider: Clinton Penn MD    Anesthesia Type: general, MAC ASA Status: 3            Anesthesia Type: general, MAC  PACU Vitals    No data found in the last 10 encounters.           Anesthesia Post Evaluation    Pain management: adequate  Patient participation: complete - patient participated  Level of consciousness: awake and alert  Cardiovascular status: acceptable  Airway Patency: adequate  Respiratory status: acceptable  Hydration status: acceptable  Anesthetic complications: no

## 2025-03-31 NOTE — H&P
"Electrophysiology History and Physical    Date of Service: 4/11/25    HPI: Mr. Branch is a pleasant 74 y.o. male patient of Dr. Boswell who has a past medical history of CAD status post 2 CANDI placement on 12/18/2019 after an abnormal stress test showing apical akinesis, mixed hyperlipidemia, CPK elevation, hypertension , keane's esophagus, and valvular heart disease.  Of note, the patient developed recurrent atrial fibrillation just prior to his previous visit with Dr. Boswell.  He was last seen in the office by Dr. Boswell on 3/16/25.  Options for his arrhythmia management at that point included changing antiarrhythmic therapy to amiodarone, ablation or EP evaluation.  Class 1 C drugs were not an option given his history of coronary disease.  He was angina free.  The patient reverted back to sinus rhythm.  He remains anticoagulated with Eliquis.  After a discussion with Dr. Leonard, it was decided to pursue a pulmonary vein isolation procedure utilizing pulse field ablation.  The procedure was discussed with him in detail including all risks involved.  Since being see, he admits to fatigue.  Denies recent illness, fever, dizziness, chest pain, palpitations, SOB, BANKS, nausea, vomiting, edema, myalgia, bleeding, and neuropathy.    Thus, today, Mr. Branch presents to [facility]'s Electrophysiology lab for an elective DANA and Atrial Fibrillation Pulse Field Ablation.  He is amendable to the procedure.  Please see the plan for further relevancy to today's procedure.    Review of Systems:  Pertinent items are noted in HPI.  A comprehensive review of systems was negative except for that stated in the HPI.    Physical Exam:  Visit Vitals  BP (!) 143/83   Pulse (!) 55   Temp 36.9 °C (98.4 °F) (Temporal)   Resp 18   Ht 1.803 m (5' 11\")   Wt 88.3 kg (194 lb 9.6 oz)   SpO2 97%   BMI 27.14 kg/m²         Physical Exam  Vitals reviewed.   Constitutional:       Appearance: Normal appearance. He is normal weight.   HENT:      " Head: Normocephalic and atraumatic.      Right Ear: External ear normal.      Left Ear: External ear normal.      Nose: Nose normal.      Mouth/Throat:      Mouth: Mucous membranes are moist.      Pharynx: Oropharynx is clear.   Eyes:      General: No scleral icterus.     Conjunctiva/sclera: Conjunctivae normal.      Pupils: Pupils are equal, round, and reactive to light.   Cardiovascular:      Rate and Rhythm: Regular rhythm. Bradycardia present.      Pulses: Normal pulses.      Heart sounds: Normal heart sounds.      Comments: Sinus Bradycardia on telemetry  Pulmonary:      Effort: Pulmonary effort is normal.      Breath sounds: Normal breath sounds.   Abdominal:      General: Bowel sounds are normal.      Palpations: Abdomen is soft.   Musculoskeletal:         General: Normal range of motion.      Cervical back: Normal range of motion and neck supple.      Right lower leg: No edema.      Left lower leg: No edema.   Skin:     General: Skin is warm and dry.   Neurological:      General: No focal deficit present.      Mental Status: He is alert and oriented to person, place, and time. Mental status is at baseline.   Psychiatric:         Mood and Affect: Mood normal.         Behavior: Behavior normal.         Thought Content: Thought content normal.         Judgment: Judgment normal.       PAST MEDICAL HISTORY:  Hypertension  Hyperlipidemia  GERD  Hurley's esophagus  CAD  -lad stenting (2019)  Moderna x2 +booster  COVID-early 2023 and mid 2024    PAST SURGICAL HISTORY:  -vasectomy  -lumbar laminectomy  2008 shoulder ACL  -urolift    SOCIAL HISTORY:   Social History     Tobacco Use    Smoking status: Former    Smokeless tobacco: Never   Vaping Use    Vaping status: Never Used   Substance Use Topics    Alcohol use: Yes     Comment: 2-3x per week    Drug use: Never     FAMILY HISTORY:  Father- at 58 with congestive heart failure, MI at 55  Mother  of CVA at  85  Female sibling 69-alive and well  His father had 6 siblings all of whom  with hypertension and/or coronary disease    ALLERGIES:     Allergies   Allergen Reactions    Lisinopril      Cough         HOME MEDICATIONS:  aspirin 81 mg tablet,delayed release (DR/EC) (aspirin) Take 1 tablet once a day   alfuzosin 10 mg tablet extended release 24 hr (alfuzosin) Take 1 tablet once a day   * ACID REDUCER 20 MG TBEC (OMEPRAZOLE MAGNESIUM) 1 tablet by mouth once a day   Eliquis 5 mg tablet (apixaban) Take 1 tablet by mouth twice a day  metoprolol succinate ER 50 mg tablet,extended release 24 hr (metoprolol succinate) Take 1 tablet by mouth once a day  losartan 25 mg tablet (losartan) 1 tablet by mouth once a day  ezetimibe 10 mg tablet (ezetimibe) Take 1 tablet by mouth once a day  rosuvastatin 20 mg tablet (rosuvastatin) Take 1 tablet by mouth once a day    LAB DATA:   Latest Reference Range & Units 25 07:34   Sodium 136 - 145 mEQ/L 142   Potassium, Bld 3.5 - 5.1 mEQ/L 4.2   Chloride 98 - 107 mEQ/L 105   CO2 21 - 31 mEQ/L 30   BUN 7 - 25 mg/dL 16   Creatinine 0.7 - 1.3 mg/dL 0.9   Glucose 70 - 99 mg/dL 98   Calcium 8.6 - 10.3 mg/dL 9.0   AST (SGOT) 13 - 39 IU/L 27   ALT (SGPT) 7 - 52 IU/L 18   Alkaline Phosphatase 34 - 125 IU/L 90   Total Protein 6.0 - 8.2 g/dL 6.7   Albumin 3.5 - 5.7 g/dL 4.1   Bilirubin, Total 0.3 - 1.2 mg/dL 0.5   eGFR >=60.0 mL/min/1.73m*2 >60.0   Anion Gap 3 - 15 mEQ/L 7   Cholesterol <=200 mg/dL 104   Triglycerides <150 mg/dL 82   HDL >=40 mg/dL 34 (L)   LDL Calculated <=100 mg/dL 54   Non-HDL, Calculated mg/dL 70   RISK <=5.0  3.1   Total CK 30 - 223 U/L 251 (H)   TSH 0.34 - 5.60 mIU/L 1.98   WBC 3.80 - 10.50 K/uL 7.08   RBC 4.50 - 5.80 M/uL 4.42 (L)   Hemoglobin 13.7 - 17.5 g/dL 13.7   Hematocrit 40.1 - 51.0 % 42.7   MCV 83.0 - 98.0 fL 96.6   MCH 28.0 - 33.2 pg 31.0   MCHC 32.2 - 36.5 g/dL 32.1 (L)   RDW 11.6 - 14.4 % 13.2   Platelets 150 - 350 K/uL 211   MPV 9.4 - 12.4 fL 12.3    Differential Type  Manu   Neutrophils % 54   Lymphocytes % 26   Monocytes % 14   Eosinophils % 1   Basophils % 0   Bands <10 % 2   Atypical Lymphocytes % 3   Neutrophils, Absolute 1.70 - 7.00 K/uL 3.82   Lymphocytes, Absolute 1.20 - 3.50 K/uL 1.84   Monocytes, Absolute 0.30 - 1.00 K/uL 0.99   Eosinophils, Absolute 0.04 - 0.54 K/uL 0.07   Basophils, Absolute 0.01 - 0.10 K/uL 0.00 (L)   Bands, Absolute 0.00 - 0.53 K/uL 0.14   Atypical Lymphs, Absolute <=0.00 K/uL 0.21 (H)   Platelet Estimate  Adequate (150,000-400,000)   Giant Platelets  Occasional   Teardrop Cells  Occasional   Renetta Cells  Occasional   Protime 12.2 - 14.5 sec 14.6 (H)   INR -  1.2   PTT 23 - 35 sec 30   (L): Data is abnormally low  (H): Data is abnormally high    CARDIAC STUDIES:  12/31/2018-echo-LVEF 65%, normal wall motion, grade 1 diastolic dysfunction, thickened mitral leaflets, mild MR, mild TR    12/09/2019-exercise nuclear perfusion images are abnormal consistent with inferior scar and mild ischemia in the apical segment. Above average exercise tolerance. Patient developed chest discomfort and borderline inferolateral  ischemic EKG changes at peak exercise. The left ventricular ejection fraction is low normal (53%).  No TID. Compared to previous nuclear stress test report of 09/25/2015 inferior fixed defect was present, reversible apical defect is new.    12/18/2019-cardiac catheterization-left main normal, lad long lesion approximately 60% though at 95% in distal portion of lesion, D1 proximal 90% lesion, circumflex proximal 30% disease, RCA 80% PDA, LVEF 60%, LVEDP 11 mm of mercury.  Lad stented with 2 Synergy drug-eluting stents     Echo of 02/18/2025 showed a 65% ejection fraction, normal wall motion, LA 4.6 cm, mild MR, mild TR, PA pressure 27.7 mmHg.  Sinus rhythm present throughout.  Compared to 12/06/2023 LA slightly larger, previously 4.2 cm now 4.6 cm      ECG Date:  03/10/2025   Sinus bradycardia   Heart Rate: 50 bpm. VA:  138  msec. QRS: 100 msec. QT: 452 msec.   ECG Comments: When compared with ECG of 10-Mar-2025 11:46, (Unconfirmed), No significant change was found   Summary: Otherwise normal.   No change since last ECG.      Impression and Plan:    1) Paroxymal Atrial Fibrillation - The patient has paroxymal atrial fibrillation.  He checks his rhythm with a kardia.  He experiences fatigue.  Of note, arrhyhtmia management options discussed included changing antiarrhythmic therapy to amiodarone or ablation.  Class 1 C drugs were not an option given his history of coronary disease.  The patient had a thorough discussion with Dr. Chilango Leonard whereby the risks, benefits, and alternatives were discussed.  The patient was agreeable to pursue an ablation strategy.  He remains on Eliquis 5 mg p.o. b.i.d. and metoprolol XL 50 mg p.o. daily.  He checks his rhythm with his kardia mobile device.      Mr. Branch presents to [facility]'s Electrophysiology Lab for an elective DANA and Atrial Fibrillation Pulse Field Ablation.  The chart/records reviewed, patient interviewed and examined.  Procedure technique re-reviewed with additional questions answered to their verbal understanding.  Risks, alternatives, and potential therapeutic benefits discussed.  Patient is noted to be taking systemic anticoagulation.  The fact that he is at increased risk for bleeding and bleeding complication was discussed.  Last dose of Eliquis was 4/10/25 in the evening.  Patient has been NPO since midnight.  Consent form was reviewed, patient executed document verbalizing understanding and wish to proceed.    2)  CAD -  No active anginal symptoms.  He is status post LAD stemHg.  Sinus rhythm present throughout.  Compared to 12/06/2023 LA slightly larger, previously 4.2 cm now 4.6 cm.  Continue routine monitoring as per primary cardiologist.    3)  Mitral Regurgitation - Mild MR.  Continue routine monitoring as per primary cardiologist.    Attestation:   Horacio  I reviewed the patient's medical record.  I spoke with the patient and obtain consent to perform a pulmonary vein isolation procedure.  He fully understands the potential risks and benefits of the procedure and agrees to proceed.

## 2025-04-04 ENCOUNTER — TRANSCRIBE ORDERS (OUTPATIENT)
Dept: LAB | Age: 75
End: 2025-04-04

## 2025-04-04 ENCOUNTER — APPOINTMENT (OUTPATIENT)
Dept: LAB | Age: 75
End: 2025-04-04
Attending: INTERNAL MEDICINE
Payer: MEDICARE

## 2025-04-04 DIAGNOSIS — E78.2 MIXED HYPERLIPIDEMIA: ICD-10-CM

## 2025-04-04 DIAGNOSIS — M79.10 MYALGIA: ICD-10-CM

## 2025-04-04 DIAGNOSIS — Z79.01 LONG TERM (CURRENT) USE OF ANTICOAGULANTS: ICD-10-CM

## 2025-04-04 DIAGNOSIS — I10 ESSENTIAL (PRIMARY) HYPERTENSION: ICD-10-CM

## 2025-04-04 DIAGNOSIS — I35.0 NONRHEUMATIC AORTIC (VALVE) STENOSIS: ICD-10-CM

## 2025-04-04 DIAGNOSIS — Z00.00 ENCOUNTER FOR GENERAL ADULT MEDICAL EXAMINATION WITHOUT ABNORMAL FINDINGS: ICD-10-CM

## 2025-04-04 DIAGNOSIS — I48.0 PAROXYSMAL ATRIAL FIBRILLATION (CMS/HCC): ICD-10-CM

## 2025-04-04 DIAGNOSIS — I11.9 HYPERTENSIVE HEART DISEASE WITHOUT HEART FAILURE: ICD-10-CM

## 2025-04-04 DIAGNOSIS — I34.0 NONRHEUMATIC MITRAL (VALVE) INSUFFICIENCY: ICD-10-CM

## 2025-04-04 DIAGNOSIS — Z98.61 CORONARY ANGIOPLASTY STATUS: ICD-10-CM

## 2025-04-04 DIAGNOSIS — G25.0 ESSENTIAL TREMOR: ICD-10-CM

## 2025-04-04 DIAGNOSIS — I48.0 PAROXYSMAL ATRIAL FIBRILLATION (CMS/HCC): Primary | ICD-10-CM

## 2025-04-04 DIAGNOSIS — E78.5 HYPERLIPIDEMIA, UNSPECIFIED: ICD-10-CM

## 2025-04-04 DIAGNOSIS — R74.8 ABNORMAL CPK: ICD-10-CM

## 2025-04-04 DIAGNOSIS — I25.10 ATHEROSCLEROTIC HEART DISEASE OF NATIVE CORONARY ARTERY WITHOUT ANGINA PECTORIS: ICD-10-CM

## 2025-04-04 DIAGNOSIS — N40.1 BENIGN PROSTATIC HYPERPLASIA WITH LOWER URINARY TRACT SYMPTOMS: ICD-10-CM

## 2025-04-04 DIAGNOSIS — Z00.00 ENCOUNTER FOR GENERAL ADULT MEDICAL EXAMINATION WITHOUT ABNORMAL FINDINGS: Primary | ICD-10-CM

## 2025-04-04 LAB
ALBUMIN SERPL-MCNC: 4.1 G/DL (ref 3.5–5.7)
ALP SERPL-CCNC: 90 IU/L (ref 34–125)
ALT SERPL-CCNC: 18 IU/L (ref 7–52)
ANION GAP SERPL CALC-SCNC: 7 MEQ/L (ref 3–15)
APTT PPP: 30 SEC (ref 23–35)
AST SERPL-CCNC: 27 IU/L (ref 13–39)
BASOPHILS # BLD: 0 K/UL (ref 0.01–0.1)
BASOPHILS NFR BLD: 0 %
BILIRUB SERPL-MCNC: 0.5 MG/DL (ref 0.3–1.2)
BUN SERPL-MCNC: 16 MG/DL (ref 7–25)
BURR CELLS BLD QL SMEAR: ABNORMAL
CALCIUM SERPL-MCNC: 9 MG/DL (ref 8.6–10.3)
CHLORIDE SERPL-SCNC: 105 MEQ/L (ref 98–107)
CHOLEST SERPL-MCNC: 104 MG/DL
CK SERPL-CCNC: 251 U/L (ref 30–223)
CO2 SERPL-SCNC: 30 MEQ/L (ref 21–31)
CREAT SERPL-MCNC: 0.9 MG/DL (ref 0.7–1.3)
DACRYOCYTES BLD QL SMEAR: ABNORMAL
DIFFERENTIAL METHOD BLD: ABNORMAL
EGFRCR SERPLBLD CKD-EPI 2021: >60 ML/MIN/1.73M*2
EOSINOPHIL # BLD: 0.07 K/UL (ref 0.04–0.54)
EOSINOPHIL NFR BLD: 1 %
ERYTHROCYTE [DISTWIDTH] IN BLOOD BY AUTOMATED COUNT: 13.2 % (ref 11.6–14.4)
GIANT PLATELETS BLD QL SMEAR: ABNORMAL
GLUCOSE SERPL-MCNC: 98 MG/DL (ref 70–99)
HCT VFR BLD AUTO: 42.7 % (ref 40.1–51)
HDLC SERPL-MCNC: 34 MG/DL
HDLC SERPL: 3.1 {RATIO}
HGB BLD-MCNC: 13.7 G/DL (ref 13.7–17.5)
INR PPP: 1.2
LDLC SERPL CALC-MCNC: 54 MG/DL
LYMPHOCYTES # BLD: 1.84 K/UL (ref 1.2–3.5)
LYMPHOCYTES NFR BLD: 26 %
MCH RBC QN AUTO: 31 PG (ref 28–33.2)
MCHC RBC AUTO-ENTMCNC: 32.1 G/DL (ref 32.2–36.5)
MCV RBC AUTO: 96.6 FL (ref 83–98)
MONOCYTES # BLD: 0.99 K/UL (ref 0.3–1)
MONOCYTES NFR BLD: 14 %
NEUTS BAND # BLD: 0.14 K/UL (ref 0–0.53)
NEUTS BAND # BLD: 3.82 K/UL (ref 1.7–7)
NEUTS BAND NFR BLD: 2 %
NEUTS SEG NFR BLD: 54 %
NONHDLC SERPL-MCNC: 70 MG/DL
PLATELET # BLD AUTO: 211 K/UL (ref 150–350)
PLATELET # BLD EST: ABNORMAL 10*3/UL
PMV BLD AUTO: 12.3 FL (ref 9.4–12.4)
POTASSIUM SERPL-SCNC: 4.2 MEQ/L (ref 3.5–5.1)
PROT SERPL-MCNC: 6.7 G/DL (ref 6–8.2)
PROTHROMBIN TIME: 14.6 SEC (ref 12.2–14.5)
RBC # BLD AUTO: 4.42 M/UL (ref 4.5–5.8)
SODIUM SERPL-SCNC: 142 MEQ/L (ref 136–145)
TRIGL SERPL-MCNC: 82 MG/DL
TSH SERPL DL<=0.05 MIU/L-ACNC: 1.98 MIU/L (ref 0.34–5.6)
VARIANT LYMPHS # BLD MANUAL: 0.21 K/UL
VARIANT LYMPHS NFR BLD: 3 %
WBC # BLD AUTO: 7.08 K/UL (ref 3.8–10.5)

## 2025-04-04 PROCEDURE — 85025 COMPLETE CBC W/AUTO DIFF WBC: CPT

## 2025-04-04 PROCEDURE — 80061 LIPID PANEL: CPT

## 2025-04-04 PROCEDURE — 85730 THROMBOPLASTIN TIME PARTIAL: CPT

## 2025-04-04 PROCEDURE — 84443 ASSAY THYROID STIM HORMONE: CPT

## 2025-04-04 PROCEDURE — 82550 ASSAY OF CK (CPK): CPT

## 2025-04-04 PROCEDURE — 36415 COLL VENOUS BLD VENIPUNCTURE: CPT

## 2025-04-04 PROCEDURE — 85610 PROTHROMBIN TIME: CPT

## 2025-04-04 PROCEDURE — 80053 COMPREHEN METABOLIC PANEL: CPT

## 2025-04-11 ENCOUNTER — ANESTHESIA (OUTPATIENT)
Dept: CARDIOLOGY | Facility: HOSPITAL | Age: 75
Setting detail: HOSPITAL OUTPATIENT SURGERY
End: 2025-04-11
Payer: MEDICARE

## 2025-04-11 ENCOUNTER — APPOINTMENT (OUTPATIENT)
Dept: CARDIOLOGY | Facility: HOSPITAL | Age: 75
Setting detail: HOSPITAL OUTPATIENT SURGERY
End: 2025-04-11
Attending: PHYSICIAN ASSISTANT
Payer: MEDICARE

## 2025-04-11 ENCOUNTER — HOSPITAL ENCOUNTER (OUTPATIENT)
Dept: CARDIOLOGY | Facility: HOSPITAL | Age: 75
Setting detail: HOSPITAL OUTPATIENT SURGERY
Discharge: HOME | End: 2025-04-11
Attending: INTERNAL MEDICINE
Payer: MEDICARE

## 2025-04-11 ENCOUNTER — HOSPITAL ENCOUNTER (OUTPATIENT)
Facility: HOSPITAL | Age: 75
Setting detail: HOSPITAL OUTPATIENT SURGERY
Discharge: HOME | End: 2025-04-11
Attending: INTERNAL MEDICINE | Admitting: INTERNAL MEDICINE
Payer: MEDICARE

## 2025-04-11 VITALS
RESPIRATION RATE: 16 BRPM | SYSTOLIC BLOOD PRESSURE: 155 MMHG | TEMPERATURE: 98.4 F | OXYGEN SATURATION: 97 % | HEART RATE: 78 BPM | HEIGHT: 71 IN | WEIGHT: 194.6 LBS | DIASTOLIC BLOOD PRESSURE: 72 MMHG | BODY MASS INDEX: 27.24 KG/M2

## 2025-04-11 VITALS — HEIGHT: 71 IN | BODY MASS INDEX: 27.16 KG/M2 | WEIGHT: 194 LBS

## 2025-04-11 DIAGNOSIS — I48.0 PAROXYSMAL ATRIAL FIBRILLATION (CMS/HCC): ICD-10-CM

## 2025-04-11 LAB
ABO + RH BLD: NORMAL
ABO + RH BLD: NORMAL
ATRIAL RATE: 68
BLD GP AB SCN SERPL QL: NEGATIVE
BSA FOR ECHO PROCEDURE: 2.1 M2
BSA FOR ECHO PROCEDURE: 2.1 M2
D AG BLD QL: POSITIVE
D AG BLD QL: POSITIVE
LAAS-AP4: 24.1 CM2
LABORATORY COMMENT REPORT: NORMAL
LABORATORY COMMENT REPORT: NORMAL
LAL MED-LAT (A4C): 7.81 CM
LEFT ATRIUM VOLUME INDEX: 30.48 CM3/M2
LEFT ATRIUM VOLUME: 64 CM3
P AXIS: 82
POCT ACT-LR: 141 SEC (ref 116–155)
POCT ACT-LR: 273 SEC (ref 116–155)
POCT ACT-LR: 281 SEC (ref 116–155)
POCT ACT-LR: 290 SEC (ref 116–155)
POCT ACT-LR: 311 SEC (ref 116–155)
POCT ACT-LR: 318 SEC (ref 116–155)
POCT ACT-LR: 343 SEC (ref 116–155)
POCT TEST: ABNORMAL
POCT TEST: NORMAL
PR INTERVAL: 162
QRS DURATION: 86
QT INTERVAL: 418
QTC CALCULATION(BAZETT): 444
R AXIS: 60
SPECIMEN EXP DATE BLD: NORMAL
T WAVE AXIS: 46
VENTRICULAR RATE: 68

## 2025-04-11 PROCEDURE — 02583ZZ DESTRUCTION OF CONDUCTION MECHANISM, PERCUTANEOUS APPROACH: ICD-10-PCS | Performed by: INTERNAL MEDICINE

## 2025-04-11 PROCEDURE — B246ZZ4 ULTRASONOGRAPHY OF RIGHT AND LEFT HEART, TRANSESOPHAGEAL: ICD-10-PCS | Performed by: INTERNAL MEDICINE

## 2025-04-11 PROCEDURE — 37000001 HC ANESTHESIA GENERAL: Performed by: INTERNAL MEDICINE

## 2025-04-11 PROCEDURE — 85347 COAGULATION TIME ACTIVATED: CPT | Performed by: INTERNAL MEDICINE

## 2025-04-11 PROCEDURE — 93320 DOPPLER ECHO COMPLETE: CPT

## 2025-04-11 PROCEDURE — C1732 CATH, EP, DIAG/ABL, 3D/VECT: HCPCS | Performed by: INTERNAL MEDICINE

## 2025-04-11 PROCEDURE — 63600000 HC DRUGS/DETAIL CODE: Performed by: STUDENT IN AN ORGANIZED HEALTH CARE EDUCATION/TRAINING PROGRAM

## 2025-04-11 PROCEDURE — 93656 COMPRE EP EVAL ABLTJ ATR FIB: CPT | Performed by: INTERNAL MEDICINE

## 2025-04-11 PROCEDURE — C1733 CATH, EP, OTHR THAN COOL-TIP: HCPCS | Performed by: INTERNAL MEDICINE

## 2025-04-11 PROCEDURE — 63700000 HC SELF-ADMINISTRABLE DRUG: Performed by: PHYSICIAN ASSISTANT

## 2025-04-11 PROCEDURE — 71000011 HC PACU PHASE 1 EA ADDL MIN: Performed by: INTERNAL MEDICINE

## 2025-04-11 PROCEDURE — C1894 INTRO/SHEATH, NON-LASER: HCPCS | Performed by: INTERNAL MEDICINE

## 2025-04-11 PROCEDURE — 36415 COLL VENOUS BLD VENIPUNCTURE: CPT | Performed by: PHYSICIAN ASSISTANT

## 2025-04-11 PROCEDURE — C1731 CATH, EP, 20 OR MORE ELEC: HCPCS | Performed by: INTERNAL MEDICINE

## 2025-04-11 PROCEDURE — 02K83ZZ MAP CONDUCTION MECHANISM, PERCUTANEOUS APPROACH: ICD-10-PCS | Performed by: INTERNAL MEDICINE

## 2025-04-11 PROCEDURE — C1730 CATH, EP, 19 OR FEW ELECT: HCPCS | Performed by: INTERNAL MEDICINE

## 2025-04-11 PROCEDURE — C1759 CATH, INTRA ECHOCARDIOGRAPHY: HCPCS | Performed by: INTERNAL MEDICINE

## 2025-04-11 PROCEDURE — 93005 ELECTROCARDIOGRAM TRACING: CPT | Performed by: PHYSICIAN ASSISTANT

## 2025-04-11 PROCEDURE — 4A023FZ MEASUREMENT OF CARDIAC RHYTHM, PERCUTANEOUS APPROACH: ICD-10-PCS | Performed by: INTERNAL MEDICINE

## 2025-04-11 PROCEDURE — 71000001 HC PACU PHASE 1 INITIAL 30MIN: Performed by: INTERNAL MEDICINE

## 2025-04-11 PROCEDURE — 27200000 HC STERILE SUPPLY: Performed by: INTERNAL MEDICINE

## 2025-04-11 PROCEDURE — 93308 TTE F-UP OR LMTD: CPT

## 2025-04-11 PROCEDURE — 25000000 HC PHARMACY GENERAL: Performed by: STUDENT IN AN ORGANIZED HEALTH CARE EDUCATION/TRAINING PROGRAM

## 2025-04-11 PROCEDURE — 93010 ELECTROCARDIOGRAM REPORT: CPT | Performed by: INTERNAL MEDICINE

## 2025-04-11 PROCEDURE — 25800000 HC PHARMACY IV SOLUTIONS: Performed by: STUDENT IN AN ORGANIZED HEALTH CARE EDUCATION/TRAINING PROGRAM

## 2025-04-11 PROCEDURE — 86900 BLOOD TYPING SEROLOGIC ABO: CPT

## 2025-04-11 PROCEDURE — 25000000 HC PHARMACY GENERAL: Performed by: INTERNAL MEDICINE

## 2025-04-11 PROCEDURE — 4A0234Z MEASUREMENT OF CARDIAC ELECTRICAL ACTIVITY, PERCUTANEOUS APPROACH: ICD-10-PCS | Performed by: INTERNAL MEDICINE

## 2025-04-11 RX ORDER — LIDOCAINE HYDROCHLORIDE 10 MG/ML
INJECTION, SOLUTION EPIDURAL; INFILTRATION; INTRACAUDAL; PERINEURAL AS NEEDED
Status: DISCONTINUED | OUTPATIENT
Start: 2025-04-11 | End: 2025-04-11

## 2025-04-11 RX ORDER — HEPARIN SODIUM 10000 [USP'U]/100ML
INJECTION, SOLUTION INTRAVENOUS CONTINUOUS PRN
Status: DISCONTINUED | OUTPATIENT
Start: 2025-04-11 | End: 2025-04-11 | Stop reason: SURG

## 2025-04-11 RX ORDER — ACETAMINOPHEN 325 MG/1
650 TABLET ORAL EVERY 4 HOURS PRN
Status: DISCONTINUED | OUTPATIENT
Start: 2025-04-11 | End: 2025-04-11 | Stop reason: HOSPADM

## 2025-04-11 RX ORDER — EPHEDRINE SULFATE 50 MG/ML
INJECTION, SOLUTION INTRAVENOUS AS NEEDED
Status: DISCONTINUED | OUTPATIENT
Start: 2025-04-11 | End: 2025-04-11 | Stop reason: SURG

## 2025-04-11 RX ORDER — SODIUM CHLORIDE 9 MG/ML
INJECTION, SOLUTION INTRAVENOUS CONTINUOUS PRN
Status: DISCONTINUED | OUTPATIENT
Start: 2025-04-11 | End: 2025-04-11 | Stop reason: SURG

## 2025-04-11 RX ORDER — PHENYLEPHRINE HCL IN 0.9% NACL 50MG/250ML
PLASTIC BAG, INJECTION (ML) INTRAVENOUS CONTINUOUS PRN
Status: DISCONTINUED | OUTPATIENT
Start: 2025-04-11 | End: 2025-04-11 | Stop reason: SURG

## 2025-04-11 RX ORDER — HEPARIN SODIUM 1000 [USP'U]/ML
INJECTION, SOLUTION INTRAVENOUS; SUBCUTANEOUS AS NEEDED
Status: DISCONTINUED | OUTPATIENT
Start: 2025-04-11 | End: 2025-04-11 | Stop reason: SURG

## 2025-04-11 RX ORDER — ONDANSETRON HYDROCHLORIDE 2 MG/ML
4 INJECTION, SOLUTION INTRAVENOUS
Status: CANCELLED | OUTPATIENT
Start: 2025-04-11

## 2025-04-11 RX ORDER — ROCURONIUM BROMIDE 10 MG/ML
INJECTION, SOLUTION INTRAVENOUS AS NEEDED
Status: DISCONTINUED | OUTPATIENT
Start: 2025-04-11 | End: 2025-04-11 | Stop reason: SURG

## 2025-04-11 RX ORDER — MIDAZOLAM HYDROCHLORIDE 2 MG/2ML
INJECTION, SOLUTION INTRAMUSCULAR; INTRAVENOUS AS NEEDED
Status: DISCONTINUED | OUTPATIENT
Start: 2025-04-11 | End: 2025-04-11 | Stop reason: SURG

## 2025-04-11 RX ORDER — PROPOFOL 10 MG/ML
INJECTION, EMULSION INTRAVENOUS AS NEEDED
Status: DISCONTINUED | OUTPATIENT
Start: 2025-04-11 | End: 2025-04-11 | Stop reason: SURG

## 2025-04-11 RX ORDER — LIDOCAINE HYDROCHLORIDE 10 MG/ML
INJECTION, SOLUTION INFILTRATION; PERINEURAL
Status: DISCONTINUED | OUTPATIENT
Start: 2025-04-11 | End: 2025-04-11 | Stop reason: HOSPADM

## 2025-04-11 RX ORDER — LIDOCAINE HYDROCHLORIDE 10 MG/ML
INJECTION, SOLUTION INFILTRATION; PERINEURAL AS NEEDED
Status: DISCONTINUED | OUTPATIENT
Start: 2025-04-11 | End: 2025-04-11 | Stop reason: SURG

## 2025-04-11 RX ORDER — ONDANSETRON HYDROCHLORIDE 2 MG/ML
INJECTION, SOLUTION INTRAVENOUS AS NEEDED
Status: DISCONTINUED | OUTPATIENT
Start: 2025-04-11 | End: 2025-04-11 | Stop reason: SURG

## 2025-04-11 RX ORDER — PROTAMINE SULFATE 10 MG/ML
INJECTION, SOLUTION INTRAVENOUS AS NEEDED
Status: DISCONTINUED | OUTPATIENT
Start: 2025-04-11 | End: 2025-04-11 | Stop reason: SURG

## 2025-04-11 RX ORDER — PHENYLEPHRINE HYDROCHLORIDE 10 MG/ML
INJECTION INTRAVENOUS AS NEEDED
Status: DISCONTINUED | OUTPATIENT
Start: 2025-04-11 | End: 2025-04-11 | Stop reason: SURG

## 2025-04-11 RX ORDER — GLYCOPYRROLATE 0.6MG/3ML
SYRINGE (ML) INTRAVENOUS AS NEEDED
Status: DISCONTINUED | OUTPATIENT
Start: 2025-04-11 | End: 2025-04-11 | Stop reason: SURG

## 2025-04-11 RX ORDER — FENTANYL CITRATE 50 UG/ML
50 INJECTION, SOLUTION INTRAMUSCULAR; INTRAVENOUS EVERY 5 MIN PRN
Status: CANCELLED | OUTPATIENT
Start: 2025-04-11

## 2025-04-11 RX ORDER — FENTANYL CITRATE 50 UG/ML
INJECTION, SOLUTION INTRAMUSCULAR; INTRAVENOUS AS NEEDED
Status: DISCONTINUED | OUTPATIENT
Start: 2025-04-11 | End: 2025-04-11 | Stop reason: SURG

## 2025-04-11 RX ADMIN — PHENYLEPHRINE HYDROCHLORIDE 100 MCG: 10 INJECTION INTRAVENOUS at 08:09

## 2025-04-11 RX ADMIN — SUGAMMADEX 200 MG: 100 INJECTION, SOLUTION INTRAVENOUS at 10:30

## 2025-04-11 RX ADMIN — GLYCOPYRROLATE 0.2 MG: 0.2 INJECTION, SOLUTION INTRAMUSCULAR; INTRAVITREAL at 08:35

## 2025-04-11 RX ADMIN — PHENYLEPHRINE HYDROCHLORIDE 100 MCG: 10 INJECTION INTRAVENOUS at 07:48

## 2025-04-11 RX ADMIN — FENTANYL CITRATE 100 MCG: 50 INJECTION, SOLUTION INTRAMUSCULAR; INTRAVENOUS at 07:33

## 2025-04-11 RX ADMIN — PROPOFOL 120 MG: 10 INJECTION, EMULSION INTRAVENOUS at 07:33

## 2025-04-11 RX ADMIN — PHENYLEPHRINE HYDROCHLORIDE 200 MCG: 10 INJECTION INTRAVENOUS at 07:58

## 2025-04-11 RX ADMIN — PHENYLEPHRINE HYDROCHLORIDE 100 MCG: 10 INJECTION INTRAVENOUS at 07:53

## 2025-04-11 RX ADMIN — GLYCOPYRROLATE 0.2 MG: 0.2 INJECTION, SOLUTION INTRAMUSCULAR; INTRAVITREAL at 07:50

## 2025-04-11 RX ADMIN — HEPARIN SODIUM 1800 UNITS/HR: 10000 INJECTION, SOLUTION INTRAVENOUS at 08:35

## 2025-04-11 RX ADMIN — MIDAZOLAM HYDROCHLORIDE 2 MG: 1 INJECTION, SOLUTION INTRAMUSCULAR; INTRAVENOUS at 07:26

## 2025-04-11 RX ADMIN — HEPARIN SODIUM 12000 UNITS: 1000 INJECTION, SOLUTION INTRAVENOUS; SUBCUTANEOUS at 08:35

## 2025-04-11 RX ADMIN — ROCURONIUM BROMIDE 20 MG: 10 INJECTION INTRAVENOUS at 08:37

## 2025-04-11 RX ADMIN — Medication 30 MCG/MIN: at 08:41

## 2025-04-11 RX ADMIN — ROCURONIUM BROMIDE 30 MG: 10 INJECTION INTRAVENOUS at 09:36

## 2025-04-11 RX ADMIN — PROTAMINE SULFATE 20 MG: 10 INJECTION, SOLUTION INTRAVENOUS at 10:26

## 2025-04-11 RX ADMIN — SODIUM CHLORIDE: 9 INJECTION, SOLUTION INTRAVENOUS at 10:25

## 2025-04-11 RX ADMIN — SODIUM CHLORIDE: 9 INJECTION, SOLUTION INTRAVENOUS at 07:26

## 2025-04-11 RX ADMIN — ONDANSETRON 4 MG: 2 INJECTION INTRAMUSCULAR; INTRAVENOUS at 10:30

## 2025-04-11 RX ADMIN — LIDOCAINE HYDROCHLORIDE 5 ML: 10 INJECTION, SOLUTION INFILTRATION; PERINEURAL at 07:33

## 2025-04-11 RX ADMIN — HEPARIN SODIUM 3000 UNITS: 1000 INJECTION, SOLUTION INTRAVENOUS; SUBCUTANEOUS at 08:52

## 2025-04-11 RX ADMIN — HEPARIN SODIUM 2000 UNITS: 1000 INJECTION, SOLUTION INTRAVENOUS; SUBCUTANEOUS at 09:26

## 2025-04-11 RX ADMIN — APIXABAN 5 MG: 5 TABLET, FILM COATED ORAL at 15:08

## 2025-04-11 RX ADMIN — EPHEDRINE SULFATE 5 MG: 50 INJECTION, SOLUTION INTRAVENOUS at 07:59

## 2025-04-11 RX ADMIN — ACETAMINOPHEN 650 MG: 325 TABLET, FILM COATED ORAL at 13:03

## 2025-04-11 RX ADMIN — EPHEDRINE SULFATE 5 MG: 50 INJECTION, SOLUTION INTRAVENOUS at 08:09

## 2025-04-11 RX ADMIN — ROCURONIUM BROMIDE 50 MG: 10 INJECTION INTRAVENOUS at 07:33

## 2025-04-11 ASSESSMENT — ENCOUNTER SYMPTOMS: DYSRHYTHMIAS: 1

## 2025-04-11 NOTE — ANESTHESIOLOGIST PRE-PROCEDURE ATTESTATION
Pre-Procedure Patient Identification:  I am the Primary Anesthesiologist and have identified the patient on 04/11/25 at 7:17 AM.   I have confirmed the procedure(s) will be performed by the following surgeon/proceduralist Chilango Leonard MD.

## 2025-04-11 NOTE — DISCHARGE INSTRUCTIONS
Please follow up with cardiology, as directed for a follow-up appointment on 05/12/25 at 09:15 AM with Dr. Chilango Leonard located at [address] [place] [address]. [address], [place], [place].  For any questions or concerns, please do not hesitate to contact the office at [phone].     Medications:  Continue taking your Eliquis 5 mg by mouth two times a day; your next dose will be tomorrow morning (4/12/25).  Continue taking your medications as previously prescribed.    Post Ablation Discharge Instructions    Groin Site Care:     A bruise or small lump under the skin where the catheters were is normal. These usually go away within one week.     Please check your wound site daily to make sure there is no redness, bleeding, or swelling.     The groin dressing should be removed the day following the procedure. A Band-Aid can be used if needed.    Showering:     You may shower 24 hours after your procedure. Clean the area with mild soap and water. Do not rub the area. Pat the area dry with a clean towel.     Do not take a bath or submerge the area under water for three days.     If you have bleeding where your catheter was:   Lie down and hold direct pressure for 10 minutes. If bleeding does not stop in 10  minutes, or if there is a large amount of bleeding, call 911. If bleeding does stop,  rest for at least 4 hours. Call your doctor.    Activity:     Do not bend over, strain, push, pull, run or lift anything over 10 pounds for 7 days.     You may exercise in 7 days.     You may return to work in 7 days.     You may walk and climb stairs when you return home.    Driving:     Do not drive a car or use any machinery for 3 days. Someone else must drive you home today.    Diet:     You may eat your normal diet as per your doctor.    Call your doctor if you have:     Increased redness, heat, pus, bruising, or bleeding at the site after 24 hours.     Swelling at the catheter site.     Increased pain at the catheter site.     Numbness,  pain or coolness in the leg.     Temperature of 100.5 degrees Fahrenheit or greater.     Chest pain or shortness of breath.

## 2025-04-11 NOTE — PROGRESS NOTES
"EP Service Same Day Discharge Note  Post DANA / A Fib Ablation  (Pulse Field Ablation)    74 y.o. male s/pTEE / AFib ablation (PFA) by Dr. Chilango Leonard today. He is feeling well. He denies chest pain or shortness of breath. He denies groin pain or back pain.     Visit Vitals  BP (!) 155/72   Pulse 78   Temp 36.9 °C (98.4 °F) (Temporal)   Resp 16   Ht 1.803 m (5' 11\")   Wt 88.3 kg (194 lb 9.6 oz)   SpO2 97%   BMI 27.14 kg/m²       Physical Exam:  General Appearance: NAD  HEENT: NCAT, MMM, PERRLA, EOMs intact, neck supple  Heart: RRR, S1, S2. No murmurs, rubs or gallops, no JVD  Lungs: CTA bilaterally. No wheezes, rales or rhonchi  Abdomen: soft, non-tender, non-distended  Groins: soft bilaterally, no bleeding, bruits or hematoma. Pressure dressings in place to bilateral groins.  Extremities: warm, peripheral pulses 2+ bilaterally, no pedal edema bilaterally.  Left wrist pressure dressing in place, C/D/I.  Skin: warm, dry, no rashes  Neuro: A&O x3, no focal neurologic deficits     Transthoracic Echocardiogram:  This is a limited echocardiogram performed after ablation for atrial fibrillation. Biventricular systolic function is normal. LVEF estimated to be 60-65%. There is no pericardial effusion.     Assessment/Plan:  1) Paroxysmal atrial fibrillation s/p transesophageal echocardiogram and pulmonary vein isolation ablation (Pulsed Field Ablation)    -patient has completed 4 hours of bedrest  -hemostatic \"Z\" sutures removed and pressure dressing to groins overnight. No bleeding or hematomas. Pressure dressings should be removed AM tomorrow and patient can shower after the dressings are removed  -patient has ambulated without any issues  -wound care and activity restrictions discussed with the patient in detail  -left radial arterial sheath site intact with dressing in place, no bleeding or hematoma  -post procedure ECG reviewed by the physician  -pre discharge limited echocardiogram revealed no evidence of " pericardial effusion  -resume Eliquis 5 mg by mouth twice daily, this evening's dose was given prior to discharge   -pain control with prn Tylenol at home  -discussed with Dr. Chilango Leonard. Patient stable for discharge.  -patient will have a follow-up appointment in the office with Dr. Chilango Leonard on 5/12/25 at 9:15 am.     MICAH Cruz  4/11/2025 3:52 PM

## 2025-04-11 NOTE — Clinical Note
Patient position: supine. DANA probe inserted with jaw lift maneuver. Probe inserted without difficulty. DANA in progress. Complications: no complications.

## 2025-04-11 NOTE — Clinical Note
Closure device placed for the left femoral and right femoral vein. Closure device used: Safeguard. Closure pressure manually applied. Hemostasis achieved.

## 2025-04-11 NOTE — ANESTHESIA PROCEDURE NOTES
Airway  Reason: elective    Start Time: 4/11/2025 7:35 AM    General Information and Staff   Patient location during procedure: OR  Anesthesiologist: Samara Walsh MD  Performed by: Samara Walsh MD  Authorized by: Samara Walsh MD        Patient Condition  Indications for airway management: anesthesia  Patient position: sniffing  Sedation level: deep     Final Airway Details   Preoxygenated: yes  Final airway type: endotracheal airway  Successful airway: ETT   Successful intubation technique: direct laryngoscopy  Adjuncts used in placement: intubating stylet  Endotracheal tube insertion site: oral  Blade: Lois  Blade size: #4  ETT size (mm): 7.0  Cormack-Lehane Classification: grade IIa - partial view of glottis  Placement verified by: chest auscultation and capnometry   Measured from: teeth  ETT to teeth (cm): 23  Number of attempts at approach: 1  Number of other approaches attempted: 0  Atraumatic airway insertion

## 2025-04-11 NOTE — ANESTHESIA PROCEDURE NOTES
Arterial Line:    Start time: 4/11/2025 5:38 PM  End time: 4/11/2025 5:40 PM    An arterial line was placed in the OR for the following indication(s): continuous blood pressure monitoring and blood sampling needed.    A 20 G (size), 1 and 3/4 inch (length), Angiocath (type) catheter was placed,   Seldinger technique used, into the Left radial artery, secured by Tegaderm.      Procedure performed using ultrasound guidance.    Image visualization comments: Ultrasound used to visualize the placement of wire and/or needle in appropriate artery.    Events:  patient tolerated procedure well with no complications.    The supervising anesthesiologist was   Performed By: anesthesiologist  Attending: Samara Walsh MD

## 2025-04-11 NOTE — ADDENDUM NOTE
Addendum  created 04/11/25 1401 by Samara Walsh MD    Child order released for a procedure order, Clinical Note Signed, Intraprocedure Blocks edited, SmartForm saved

## 2025-04-11 NOTE — ANESTHESIA PREPROCEDURE EVALUATION
Relevant Problems   CARDIOVASCULAR   (+) CAD (coronary artery disease)   (+) Paroxysmal atrial fibrillation (CMS/HCC)       Anesthesia ROS/MED HX    Anesthesia History - neg  Cardiovascular   CAD   Cardiac stents   hypertension  Dysrhythmias and atrial fibrillation     74M w/ hx of pAfib on eliquis, CAD s/p CANDI x 2, htn, gerd s/f PVI     TTE - 2025 - LVEF 65%, mild MR    Past Surgical History   Procedure Laterality Date    Bladder surgery      Urolift    Cardioversion external N/A 10/28/2024    Performed by Matthew Leonard MD at  GI    LEFT HEART CATH WITH CORONARY ANGIOGRAPHY N/A 12/18/2019    Performed by Goran Ribeiro MD at  CARDIAC CATH/EP    Shoulder surgery Right     right sided    Stent CANDI coronary - initial vessel N/A 12/18/2019    Performed by Goran Ribeiro MD at  CARDIAC CATH/EP       Physical Exam    Airway   Mallampati: II   TM distance: >3 FB   Neck ROM: full  Cardiovascular - normal   Rhythm: regular   Rate: normalPulmonary - normal   clear to auscultation  Dental - normal        Anesthesia Plan    Plan: general    Technique: general endotracheal     Lines and Monitors: PIV and arterial line     Airway: direct visual laryngoscopy    3 ASA  Anesthetic plan and risks discussed with: patient

## 2025-04-11 NOTE — POST-PROCEDURE NOTE
"Cardiovascular Post Procedure Note:    Klaus Branch  4/11/2025    Pre-op Diagnosis      * Paroxysmal atrial fibrillation (CMS/HCC) [I48.0]   Post-op Diagnosis     * Paroxysmal atrial fibrillation (CMS/HCC) [I48.0]     Procedure(s):  Ablation atrial fibrillation   Surgeon(s):  Chilango Leonard MD    Findings:  EP Service Post Op Check- DANA / A Fib Ablation (Pulsed Field Ablation)    74 y.o. male s/p Transesophageal echocardiogram / A Fib Ablation (Pulsed Field Ablation) by Dr. Chilango Leonard today. He is feeling well. He denies chest pain or shortness of breath. He denies groin pain or back pain.     Visit Vitals  /69   Pulse 70   Temp 36.9 °C (98.4 °F) (Temporal)   Resp 18   Ht 1.803 m (5' 11\")   Wt 88.3 kg (194 lb 9.6 oz)   SpO2 97%   BMI 27.14 kg/m²       Physical Exam:  General Appearance: NAD  HEENT: NCAT, MMM, PERRLA, EOMs intact, neck supple  Heart: RRR, S1, S2. No murmurs, rubs or gallops, no JVD  Lungs: CTA bilaterally. No wheezes, rales or rhonchi  Abdomen: soft, non-tender, non-distended  Groins: soft bilaterally, no bleeding, bruits or hematoma. Hemostatic \"Z\" sutures in place bilaterally  Extremities: warm, peripheral pulses 2+ bilaterally, no pedal edema bilaterally. Left radial arterial line intact. No bleeding or hematoma.  Skin: warm, dry, no rashes  Neuro: A&O x3, no focal neurologic deficits     Assessment/Plan:  1) Paroxysmal atrial fibrillation s/p transesophageal echocardiogram and pulmonary vein isolation ablation (Pulsed Field Ablation)    -bedrest x 4 hours  -hemostatic \"Z\" sutures and pressure dressing to bilateral groins. Sutures will be removed prior to discharge. Monitor closely for bleeding and hematomas.  -wound care and activity restrictions discussed with the patient in detail and placed in patient after visit summary  -left radial arterial line intact. Utilize manual pressure for removal of arterial line according to protocol.  -resume Eliquis 5 mg by mouth twice daily, " this evening's dose will be given prior to discharge  -continue home medications  -pain control with prn Tylenol  -monitor on telemetry  -limited transthoracic echo will be completed to rule out pericardial effusion prior to discharge  -anticipated discharge to home this evening  -patient will have a follow-up appointment in the office with Dr. Chilango Leonard in one month.     Allyssa Beavers PA-C     Anesthesia:  Intubation    Staff:   Circulator: Khari Rosa, RTKASH; Ashley Romero RN; Kendy Johnson RN; Peter Leo RN  Scrub Person: Allyssa Beavers PA C  Documenter: Otilio Vega RTR     Estimated Blood Loss:   No blood loss documented.     Specimens:   Order Name Source Comment Collection Info Order Time   TYPE AND SCREEN Blood, Venous  Collected By: Nancy Park RN 4/11/2025 12:00 AM     Are you aware of this patient having previously identified antibodies?   NO          Does this Patient have Sickle Cell Disease/Sickle Cell Anemia?   NO          Release to patient   Immediate        REPEAT ABO/RH (TYPE CHECK) Blood, Venous  Collected By: Otilio Vega RTR 4/11/2025  7:20 AM     Release to patient   Immediate             Implants:   Nothing was implanted during the procedure     Complications:  None    Date: 4/11/2025 Time: 11:03 AM     EOAE (evoked otoacoustic emission)

## 2025-04-11 NOTE — ANESTHESIA POSTPROCEDURE EVALUATION
Patient: Klaus Branch    Procedure Summary       Date: 04/11/25 Room / Location:  [facility] EP LAB 5 / [facility] CARDIAC CATH/EP/NEURO    Anesthesia Start: 0726 Anesthesia Stop: 1056    Procedure: Ablation atrial fibrillation Diagnosis:       Paroxysmal atrial fibrillation (CMS/HCC)      (Paroxysmal atrial fibrillation)    Providers: Chilango Leonard MD Responsible Provider: Samara Walsh MD    Anesthesia Type: general ASA Status: 3            Anesthesia Type: general  PACU Vitals  4/11/2025 1054 - 4/11/2025 1154        4/11/2025  1100 4/11/2025  1115 4/11/2025  1130 4/11/2025  1145    BP: 143/69 132/61 151/70 148/72    Arterial Line BP: 141/62 132/60 154/68 --    Pulse: 70 65 69 67    Resp: 20 15 13 12    SpO2: 99 % 97 % 96 % 100 %              Anesthesia Post Evaluation    Pain management: adequate  Patient participation: complete - patient participated  Level of consciousness: awake and alert  Cardiovascular status: acceptable  Airway Patency: adequate  Respiratory status: acceptable  Hydration status: acceptable  Anesthetic complications: no

## 2025-04-11 NOTE — Clinical Note
Patient placed on procedure table in supine position. Positioning devices: all pressure points padded, arm board under arms, safety strap applied, wrist straps in place and donut foam under head.

## 2025-04-11 NOTE — Clinical Note
Patient position: supine. DANA probe inserted with jaw lift maneuver. Probe inserted without difficulty. DANA probe removed. . Complications: no complications.

## (undated) DEVICE — CATH D 6F FR4 100CM

## (undated) DEVICE — ***USE 121412***PACK DEVICE IMPLANT TLH

## (undated) DEVICE — TR BAND REGULAR

## (undated) DEVICE — CATH BALL EMERGE MONO 20/2.25MM

## (undated) DEVICE — CATH 6FR FL3.5

## (undated) DEVICE — GUIDEWIRE BMW UNIVERSAL 190CM "J"

## (undated) DEVICE — CATH DYNAMIC DECA 6FR LARGE 4.0

## (undated) DEVICE — ELECTRODE KIT ENSITE X SURFACE

## (undated) DEVICE — SHEATH VC CONNECT FARADRIVE 180P

## (undated) DEVICE — PAD DEFIB BIPHASIC HANDS FREE

## (undated) DEVICE — ***USE 141919***CATH BALLOON NC TREK 2.5X15MM

## (undated) DEVICE — GLIDESHEATH SLENDER SS (.021) 6FR 10CM

## (undated) DEVICE — GUIDEWIRE DIAGNOSTIC 035-260 EXCHANGE

## (undated) DEVICE — CATH 6FR PIG 145 ANGLE

## (undated) DEVICE — NEEDLE PERCUTANEOUS ENTRY

## (undated) DEVICE — CATHETER FARAWAVE PFA 31 MM

## (undated) DEVICE — CATH GUIDING 6FR .070 XB 3.0

## (undated) DEVICE — ***USE 141947***CATH BALLOON NC TREK 2.5X12MM

## (undated) DEVICE — SHEATH ULTIMUM 7FR ACT 12CM 10/BX

## (undated) DEVICE — CATH ACUNAV 8FR

## (undated) DEVICE — SHEATH FARADRIVE STEERABLE CLEAR

## (undated) DEVICE — SHEATH ULTIMUM 9FR 12CM 10/BX

## (undated) DEVICE — KIT CATH LAB ANGIO

## (undated) DEVICE — CATH ADVISOR VL BIDIRECTIONAL DF CURVE DUO DECAPOLAR

## (undated) DEVICE — SAFEGUARD PRESSURE ASSISTED DRESSING

## (undated) DEVICE — SHEATH ULTIMUM 9FR 23CM